# Patient Record
Sex: FEMALE | Race: OTHER | Employment: FULL TIME | URBAN - NONMETROPOLITAN AREA
[De-identification: names, ages, dates, MRNs, and addresses within clinical notes are randomized per-mention and may not be internally consistent; named-entity substitution may affect disease eponyms.]

---

## 2017-03-22 ENCOUNTER — OFFICE VISIT (OUTPATIENT)
Dept: PRIMARY CARE CLINIC | Age: 37
End: 2017-03-22
Payer: COMMERCIAL

## 2017-03-22 VITALS
HEART RATE: 114 BPM | BODY MASS INDEX: 35.3 KG/M2 | HEIGHT: 60 IN | RESPIRATION RATE: 18 BRPM | TEMPERATURE: 100.7 F | SYSTOLIC BLOOD PRESSURE: 124 MMHG | WEIGHT: 179.8 LBS | DIASTOLIC BLOOD PRESSURE: 72 MMHG | OXYGEN SATURATION: 97 %

## 2017-03-22 DIAGNOSIS — J11.1 INFLUENZA-LIKE ILLNESS: Primary | ICD-10-CM

## 2017-03-22 DIAGNOSIS — J02.9 SORE THROAT: ICD-10-CM

## 2017-03-22 DIAGNOSIS — R05.9 COUGH: ICD-10-CM

## 2017-03-22 LAB
INFLUENZA A ANTIBODY: NORMAL
INFLUENZA B ANTIBODY: NORMAL
S PYO AG THROAT QL: NORMAL

## 2017-03-22 PROCEDURE — 87804 INFLUENZA ASSAY W/OPTIC: CPT | Performed by: NURSE PRACTITIONER

## 2017-03-22 PROCEDURE — 99213 OFFICE O/P EST LOW 20 MIN: CPT | Performed by: NURSE PRACTITIONER

## 2017-03-22 PROCEDURE — 87880 STREP A ASSAY W/OPTIC: CPT | Performed by: NURSE PRACTITIONER

## 2017-03-22 ASSESSMENT — ENCOUNTER SYMPTOMS
RHINORRHEA: 1
SWOLLEN GLANDS: 1
SORE THROAT: 1
COUGH: 1

## 2017-03-31 ENCOUNTER — OFFICE VISIT (OUTPATIENT)
Dept: PRIMARY CARE CLINIC | Age: 37
End: 2017-03-31
Payer: COMMERCIAL

## 2017-03-31 VITALS
WEIGHT: 177.4 LBS | SYSTOLIC BLOOD PRESSURE: 110 MMHG | HEIGHT: 60 IN | HEART RATE: 80 BPM | BODY MASS INDEX: 34.83 KG/M2 | DIASTOLIC BLOOD PRESSURE: 68 MMHG

## 2017-03-31 DIAGNOSIS — H10.31 ACUTE BACTERIAL CONJUNCTIVITIS OF RIGHT EYE: Primary | ICD-10-CM

## 2017-03-31 PROCEDURE — 99213 OFFICE O/P EST LOW 20 MIN: CPT | Performed by: NURSE PRACTITIONER

## 2017-03-31 RX ORDER — SULFACETAMIDE SODIUM 100 MG/ML
2 SOLUTION/ DROPS OPHTHALMIC 4 TIMES DAILY
Qty: 1 BOTTLE | Refills: 0 | Status: SHIPPED | OUTPATIENT
Start: 2017-03-31 | End: 2017-04-10

## 2017-03-31 ASSESSMENT — ENCOUNTER SYMPTOMS
FOREIGN BODY SENSATION: 1
COUGH: 0
WHEEZING: 0
SHORTNESS OF BREATH: 0
EYE REDNESS: 1
EYE PAIN: 1
BLURRED VISION: 0
EYE DISCHARGE: 1
EYE ITCHING: 1

## 2017-04-03 ENCOUNTER — HOSPITAL ENCOUNTER (OUTPATIENT)
Age: 37
Setting detail: SPECIMEN
Discharge: HOME | End: 2017-04-03
Payer: COMMERCIAL

## 2017-04-03 ENCOUNTER — TELEPHONE (OUTPATIENT)
Dept: FAMILY MEDICINE CLINIC | Age: 37
End: 2017-04-03

## 2017-04-03 ENCOUNTER — OFFICE VISIT (OUTPATIENT)
Dept: INTERNAL MEDICINE | Age: 37
End: 2017-04-03
Payer: COMMERCIAL

## 2017-04-03 VITALS
HEART RATE: 92 BPM | BODY MASS INDEX: 34.71 KG/M2 | DIASTOLIC BLOOD PRESSURE: 74 MMHG | WEIGHT: 176.8 LBS | HEIGHT: 60 IN | SYSTOLIC BLOOD PRESSURE: 112 MMHG

## 2017-04-03 DIAGNOSIS — Z13.29 SCREENING FOR THYROID DISORDER: ICD-10-CM

## 2017-04-03 DIAGNOSIS — Z12.4 CERVICAL CANCER SCREENING: ICD-10-CM

## 2017-04-03 DIAGNOSIS — R45.86 MOOD SWING: ICD-10-CM

## 2017-04-03 DIAGNOSIS — Z13.220 SCREENING FOR LIPOID DISORDERS: ICD-10-CM

## 2017-04-03 DIAGNOSIS — H04.209 WATERING OF EYE: Primary | ICD-10-CM

## 2017-04-03 DIAGNOSIS — Z01.419 WELL FEMALE EXAM WITH ROUTINE GYNECOLOGICAL EXAM: Primary | ICD-10-CM

## 2017-04-03 PROCEDURE — 99395 PREV VISIT EST AGE 18-39: CPT | Performed by: NURSE PRACTITIONER

## 2017-04-03 RX ORDER — FLUOXETINE 10 MG/1
10 CAPSULE ORAL DAILY
Qty: 30 CAPSULE | Refills: 0 | Status: SHIPPED | OUTPATIENT
Start: 2017-04-03 | End: 2017-04-03

## 2017-04-03 RX ORDER — IBUPROFEN 800 MG/1
800 TABLET ORAL EVERY 6 HOURS PRN
Qty: 80 TABLET | Refills: 3 | Status: SHIPPED | OUTPATIENT
Start: 2017-04-03 | End: 2017-05-26

## 2017-04-05 ENCOUNTER — OFFICE VISIT (OUTPATIENT)
Dept: OPTOMETRY | Age: 37
End: 2017-04-05
Payer: COMMERCIAL

## 2017-04-05 DIAGNOSIS — B00.52 HERPES SIMPLEX KERATITIS OF RIGHT EYE: Primary | ICD-10-CM

## 2017-04-05 PROCEDURE — 99203 OFFICE O/P NEW LOW 30 MIN: CPT | Performed by: OPTOMETRIST

## 2017-04-05 RX ORDER — TRIFLURIDINE 10 MG/ML
1 SOLUTION OPHTHALMIC
Qty: 1 BOTTLE | Refills: 3 | Status: SHIPPED | OUTPATIENT
Start: 2017-04-05 | End: 2017-05-26 | Stop reason: ALTCHOICE

## 2017-04-05 ASSESSMENT — VISUAL ACUITY
OS_SC: 20/20
METHOD: SNELLEN - LINEAR
OD_SC: 20/30

## 2017-04-06 ENCOUNTER — OFFICE VISIT (OUTPATIENT)
Dept: OPTOMETRY | Age: 37
End: 2017-04-06

## 2017-04-06 DIAGNOSIS — B00.52 HERPES SIMPLEX KERATITIS OF RIGHT EYE: Primary | ICD-10-CM

## 2017-04-06 LAB — CYTOLOGY REPORT: NORMAL

## 2017-04-06 PROCEDURE — 99999 PR OFFICE/OUTPT VISIT,PROCEDURE ONLY: CPT | Performed by: OPTOMETRIST

## 2017-04-06 ASSESSMENT — SLIT LAMP EXAM - LIDS: COMMENTS: SLIGHT TEARING

## 2017-04-06 ASSESSMENT — VISUAL ACUITY
METHOD: SNELLEN - LINEAR
OD_SC: 20/25
OS_SC: 20/20

## 2017-04-06 ASSESSMENT — ENCOUNTER SYMPTOMS: EYES NEGATIVE: 1

## 2017-04-13 ENCOUNTER — OFFICE VISIT (OUTPATIENT)
Dept: OPTOMETRY | Age: 37
End: 2017-04-13
Payer: COMMERCIAL

## 2017-04-13 DIAGNOSIS — B00.52 HERPES SIMPLEX KERATITIS OF RIGHT EYE: Primary | ICD-10-CM

## 2017-04-13 PROCEDURE — 99212 OFFICE O/P EST SF 10 MIN: CPT | Performed by: OPTOMETRIST

## 2017-04-13 ASSESSMENT — VISUAL ACUITY
OS_SC: 20/20
METHOD: SNELLEN - LINEAR
OD_SC: 20/30

## 2017-04-18 ENCOUNTER — OFFICE VISIT (OUTPATIENT)
Dept: OPTOMETRY | Age: 37
End: 2017-04-18
Payer: COMMERCIAL

## 2017-04-18 DIAGNOSIS — B00.52 HERPES SIMPLEX KERATITIS OF RIGHT EYE: Primary | ICD-10-CM

## 2017-04-18 DIAGNOSIS — H17.9 CORNEAL SCAR, RIGHT EYE: ICD-10-CM

## 2017-04-18 PROCEDURE — 99212 OFFICE O/P EST SF 10 MIN: CPT | Performed by: OPTOMETRIST

## 2017-04-18 RX ORDER — PREDNISOLONE ACETATE 10 MG/ML
1 SUSPENSION/ DROPS OPHTHALMIC 4 TIMES DAILY
Qty: 1 BOTTLE | Refills: 0 | Status: SHIPPED | OUTPATIENT
Start: 2017-04-18 | End: 2017-05-26 | Stop reason: ALTCHOICE

## 2017-04-18 ASSESSMENT — SLIT LAMP EXAM - LIDS: COMMENTS: NORMAL

## 2017-04-18 ASSESSMENT — VISUAL ACUITY
METHOD: SNELLEN - LINEAR
OS_SC: 20/20
OD_SC: 20/20

## 2017-05-01 ENCOUNTER — TELEPHONE (OUTPATIENT)
Dept: OBGYN | Age: 37
End: 2017-05-01

## 2017-05-01 DIAGNOSIS — N91.2 AMENORRHEA: Primary | ICD-10-CM

## 2017-05-02 ENCOUNTER — OFFICE VISIT (OUTPATIENT)
Dept: OPTOMETRY | Age: 37
End: 2017-05-02

## 2017-05-02 DIAGNOSIS — B00.52 HERPES SIMPLEX KERATITIS OF RIGHT EYE: Primary | ICD-10-CM

## 2017-05-02 PROCEDURE — 99999 PR OFFICE/OUTPT VISIT,PROCEDURE ONLY: CPT | Performed by: OPTOMETRIST

## 2017-05-02 ASSESSMENT — VISUAL ACUITY
OS_SC: 20/20
METHOD: SNELLEN - LINEAR
OD_SC: 20/20

## 2017-05-02 ASSESSMENT — TONOMETRY
IOP_METHOD: PALPATION
OD_IOP_MMHG: 16

## 2017-05-02 ASSESSMENT — SLIT LAMP EXAM - LIDS
COMMENTS: NORMAL
COMMENTS: NORMAL

## 2017-05-26 ENCOUNTER — ROUTINE PRENATAL (OUTPATIENT)
Dept: OBGYN | Age: 37
End: 2017-05-26

## 2017-05-26 VITALS — BODY MASS INDEX: 35.35 KG/M2 | WEIGHT: 178 LBS

## 2017-05-26 DIAGNOSIS — Z34.91 UNCERTAIN DATES, ANTEPARTUM, FIRST TRIMESTER: Primary | ICD-10-CM

## 2017-05-26 DIAGNOSIS — Z3A.09 9 WEEKS GESTATION OF PREGNANCY: ICD-10-CM

## 2017-05-26 LAB
ABO/RH: NORMAL
ANTIBODY SCREEN: NEGATIVE

## 2017-05-26 PROCEDURE — 0500F INITIAL PRENATAL CARE VISIT: CPT | Performed by: OBSTETRICS & GYNECOLOGY

## 2017-05-26 RX ORDER — VITAMIN A ACETATE, BETA CAROTENE, ASCORBIC ACID, CHOLECALCIFEROL, .ALPHA.-TOCOPHEROL ACETATE, DL-, THIAMINE MONONITRATE, RIBOFLAVIN, NIACINAMIDE, PYRIDOXINE HYDROCHLORIDE, FOLIC ACID, CYANOCOBALAMIN, CALCIUM CARBONATE, FERROUS FUMARATE, ZINC OXIDE, CUPRIC OXIDE 3080; 12; 120; 400; 1; 1.84; 3; 20; 22; 920; 25; 200; 27; 10; 2 [IU]/1; UG/1; MG/1; [IU]/1; MG/1; MG/1; MG/1; MG/1; MG/1; [IU]/1; MG/1; MG/1; MG/1; MG/1; MG/1
1 TABLET, FILM COATED ORAL DAILY
Qty: 90 TABLET | Refills: 3 | Status: SHIPPED | OUTPATIENT
Start: 2017-05-26

## 2017-05-30 DIAGNOSIS — Q51.3 BICORNATE UTERUS: Primary | ICD-10-CM

## 2017-06-20 ENCOUNTER — INITIAL PRENATAL (OUTPATIENT)
Dept: OBGYN | Age: 37
End: 2017-06-20
Payer: COMMERCIAL

## 2017-06-20 ENCOUNTER — HOSPITAL ENCOUNTER (OUTPATIENT)
Age: 37
Setting detail: SPECIMEN
Discharge: HOME | End: 2017-06-20
Payer: COMMERCIAL

## 2017-06-20 VITALS
HEART RATE: 80 BPM | SYSTOLIC BLOOD PRESSURE: 104 MMHG | WEIGHT: 177 LBS | BODY MASS INDEX: 35.15 KG/M2 | DIASTOLIC BLOOD PRESSURE: 76 MMHG

## 2017-06-20 DIAGNOSIS — O09.521 AMA (ADVANCED MATERNAL AGE) MULTIGRAVIDA 35+, FIRST TRIMESTER: Primary | ICD-10-CM

## 2017-06-20 LAB
DIRECT EXAM: ABNORMAL
Lab: ABNORMAL
SPECIMEN DESCRIPTION: ABNORMAL
SPECIMEN DESCRIPTION: ABNORMAL
STATUS: ABNORMAL

## 2017-06-20 PROCEDURE — 87491 CHLMYD TRACH DNA AMP PROBE: CPT | Performed by: FAMILY MEDICINE

## 2017-06-20 PROCEDURE — 0500F INITIAL PRENATAL CARE VISIT: CPT | Performed by: OBSTETRICS & GYNECOLOGY

## 2017-06-20 PROCEDURE — 87660 TRICHOMONAS VAGIN DIR PROBE: CPT | Performed by: FAMILY MEDICINE

## 2017-06-20 PROCEDURE — 87480 CANDIDA DNA DIR PROBE: CPT | Performed by: FAMILY MEDICINE

## 2017-06-20 PROCEDURE — 87591 N.GONORRHOEAE DNA AMP PROB: CPT | Performed by: FAMILY MEDICINE

## 2017-06-20 PROCEDURE — 87510 GARDNER VAG DNA DIR PROBE: CPT | Performed by: FAMILY MEDICINE

## 2017-06-21 LAB
C TRACH DNA GENITAL QL NAA+PROBE: NEGATIVE
CYTOLOGY REPORT: NORMAL
N. GONORRHOEAE DNA: NEGATIVE

## 2017-06-30 ENCOUNTER — ROUTINE PRENATAL (OUTPATIENT)
Dept: PERINATAL CARE | Age: 37
End: 2017-06-30
Payer: COMMERCIAL

## 2017-06-30 VITALS
DIASTOLIC BLOOD PRESSURE: 69 MMHG | RESPIRATION RATE: 16 BRPM | TEMPERATURE: 97.8 F | BODY MASS INDEX: 35.25 KG/M2 | WEIGHT: 177.5 LBS | SYSTOLIC BLOOD PRESSURE: 116 MMHG | HEART RATE: 90 BPM

## 2017-06-30 DIAGNOSIS — O09.521 AMA (ADVANCED MATERNAL AGE) MULTIGRAVIDA 35+, FIRST TRIMESTER: Primary | ICD-10-CM

## 2017-06-30 DIAGNOSIS — O99.211 OBESITY COMPLICATING PREGNANCY, FIRST TRIMESTER: ICD-10-CM

## 2017-06-30 DIAGNOSIS — O34.01: ICD-10-CM

## 2017-06-30 DIAGNOSIS — Z3A.13 13 WEEKS GESTATION OF PREGNANCY: ICD-10-CM

## 2017-06-30 DIAGNOSIS — Z36.9 FIRST TRIMESTER SCREENING: ICD-10-CM

## 2017-06-30 PROBLEM — O34.00 CONGENITAL ABNORMALITIES OF PREGNANT UTERUS, ANTEPARTUM: Status: ACTIVE | Noted: 2017-06-30

## 2017-06-30 PROBLEM — O99.210 OBESITY COMPLICATING PREGNANCY: Status: ACTIVE | Noted: 2017-06-30

## 2017-06-30 PROBLEM — O09.529 AMA (ADVANCED MATERNAL AGE) MULTIGRAVIDA 35+: Status: ACTIVE | Noted: 2017-06-30

## 2017-06-30 PROCEDURE — 76813 OB US NUCHAL MEAS 1 GEST: CPT | Performed by: OBSTETRICS & GYNECOLOGY

## 2017-06-30 PROCEDURE — 76801 OB US < 14 WKS SINGLE FETUS: CPT | Performed by: OBSTETRICS & GYNECOLOGY

## 2017-06-30 PROCEDURE — 99242 OFF/OP CONSLTJ NEW/EST SF 20: CPT | Performed by: OBSTETRICS & GYNECOLOGY

## 2017-07-06 ENCOUNTER — HOSPITAL ENCOUNTER (OUTPATIENT)
Age: 37
Discharge: HOME | End: 2017-07-06
Payer: COMMERCIAL

## 2017-07-06 PROCEDURE — 36415 COLL VENOUS BLD VENIPUNCTURE: CPT

## 2017-07-07 LAB
SEND OUT REPORT: NORMAL
TEST NAME: NORMAL

## 2017-07-10 LAB
GLUCOSE ADMINISTRATION: ABNORMAL
GLUCOSE TOLERANCE SCREEN 50G: 197 MG/DL (ref 70–135)

## 2017-07-18 ENCOUNTER — ROUTINE PRENATAL (OUTPATIENT)
Dept: OBGYN | Age: 37
End: 2017-07-18

## 2017-07-18 VITALS
HEART RATE: 84 BPM | DIASTOLIC BLOOD PRESSURE: 74 MMHG | SYSTOLIC BLOOD PRESSURE: 122 MMHG | WEIGHT: 180 LBS | BODY MASS INDEX: 35.75 KG/M2

## 2017-07-18 DIAGNOSIS — Z34.92 PRENATAL CARE IN SECOND TRIMESTER: Primary | ICD-10-CM

## 2017-07-18 PROCEDURE — 0502F SUBSEQUENT PRENATAL CARE: CPT | Performed by: OBSTETRICS & GYNECOLOGY

## 2017-07-20 ENCOUNTER — TELEPHONE (OUTPATIENT)
Dept: PERINATAL CARE | Age: 37
End: 2017-07-20

## 2017-07-20 ENCOUNTER — ROUTINE PRENATAL (OUTPATIENT)
Dept: PERINATAL CARE | Age: 37
End: 2017-07-20
Payer: COMMERCIAL

## 2017-07-20 VITALS
WEIGHT: 181 LBS | DIASTOLIC BLOOD PRESSURE: 62 MMHG | TEMPERATURE: 98 F | SYSTOLIC BLOOD PRESSURE: 108 MMHG | HEART RATE: 92 BPM | BODY MASS INDEX: 35.95 KG/M2 | RESPIRATION RATE: 16 BRPM

## 2017-07-20 DIAGNOSIS — O24.119 PRE-EXISTING TYPE 2 DIABETES MELLITUS DURING PREGNANCY, ANTEPARTUM: ICD-10-CM

## 2017-07-20 DIAGNOSIS — O99.212 OBESITY COMPLICATING PREGNANCY, SECOND TRIMESTER: ICD-10-CM

## 2017-07-20 DIAGNOSIS — O09.522 AMA (ADVANCED MATERNAL AGE) MULTIGRAVIDA 35+, SECOND TRIMESTER: ICD-10-CM

## 2017-07-20 DIAGNOSIS — Z13.89 ENCOUNTER FOR ROUTINE SCREENING FOR MALFORMATION USING ULTRASONICS: ICD-10-CM

## 2017-07-20 DIAGNOSIS — Z3A.16 16 WEEKS GESTATION OF PREGNANCY: ICD-10-CM

## 2017-07-20 DIAGNOSIS — O24.419 GESTATIONAL DIABETES MELLITUS (GDM) IN SECOND TRIMESTER, GESTATIONAL DIABETES METHOD OF CONTROL UNSPECIFIED: Primary | ICD-10-CM

## 2017-07-20 DIAGNOSIS — O99.810 ABNORMAL MATERNAL GLUCOSE TOLERANCE, ANTEPARTUM: Primary | ICD-10-CM

## 2017-07-20 DIAGNOSIS — O34.02: Primary | ICD-10-CM

## 2017-07-20 PROCEDURE — 76805 OB US >/= 14 WKS SNGL FETUS: CPT | Performed by: OBSTETRICS & GYNECOLOGY

## 2017-07-20 PROCEDURE — 76817 TRANSVAGINAL US OBSTETRIC: CPT | Performed by: OBSTETRICS & GYNECOLOGY

## 2017-07-24 LAB
BUN BLDV-MCNC: 8 MG/DL (ref 6–20)
CREAT SERPL-MCNC: 0.41 MG/DL (ref 0.5–0.9)
CREAT SERPL-MCNC: 0.41 MG/DL (ref 0.5–0.9)
CREATININE CLEARANCE: 61.8 ML/MIN/BSA (ref 71–151)
CREATININE TIMED UR: NORMAL MG/ X H
CREATININE URINE: 57.8 MG/DL
CREATININE URINE: 57.8 MG/DL (ref 28–217)
CREATININE, 24H UR: 1017 MG/24 H (ref 740–1570)
ESTIMATED AVERAGE GLUCOSE: 114 MG/DL
GFR AFRICAN AMERICAN: >60 ML/MIN
GFR NON-AFRICAN AMERICAN: >60 ML/MIN
GFR SERPL CREATININE-BSD FRML MDRD: ABNORMAL ML/MIN/{1.73_M2}
GFR SERPL CREATININE-BSD FRML MDRD: ABNORMAL ML/MIN/{1.73_M2}
HBA1C MFR BLD: 5.6 % (ref 4.8–5.9)
HOURS COLLECTED: 24 H
HOURS COLLECTED: 24 H
LENGTH OF COLLECTION: 24 H
PATIENT HEIGHT: 152 CM
PATIENT WEIGHT: 852 KG
PROTEIN 24 HOUR URINE: 141 MG/24 H
PROTEIN,TOT TIMED UR: NORMAL MG/X H
URINE TOTAL PROTEIN: 8 MG/DL
VOLUME: 1760 ML

## 2017-08-04 ENCOUNTER — ROUTINE PRENATAL (OUTPATIENT)
Dept: PERINATAL CARE | Age: 37
End: 2017-08-04
Payer: COMMERCIAL

## 2017-08-04 ENCOUNTER — TELEPHONE (OUTPATIENT)
Dept: PERINATAL CARE | Age: 37
End: 2017-08-04

## 2017-08-04 VITALS
HEIGHT: 60 IN | TEMPERATURE: 98.1 F | SYSTOLIC BLOOD PRESSURE: 110 MMHG | RESPIRATION RATE: 16 BRPM | HEART RATE: 87 BPM | WEIGHT: 180 LBS | DIASTOLIC BLOOD PRESSURE: 63 MMHG | BODY MASS INDEX: 35.34 KG/M2

## 2017-08-04 DIAGNOSIS — Z3A.18 18 WEEKS GESTATION OF PREGNANCY: ICD-10-CM

## 2017-08-04 DIAGNOSIS — O99.212 OBESITY COMPLICATING PREGNANCY, SECOND TRIMESTER: ICD-10-CM

## 2017-08-04 DIAGNOSIS — O24.119 PRE-EXISTING TYPE 2 DIABETES MELLITUS DURING PREGNANCY, ANTEPARTUM: Primary | ICD-10-CM

## 2017-08-04 DIAGNOSIS — O09.522 AMA (ADVANCED MATERNAL AGE) MULTIGRAVIDA 35+, SECOND TRIMESTER: ICD-10-CM

## 2017-08-04 DIAGNOSIS — O34.02: ICD-10-CM

## 2017-08-04 PROCEDURE — 76815 OB US LIMITED FETUS(S): CPT | Performed by: OBSTETRICS & GYNECOLOGY

## 2017-08-04 PROCEDURE — 76817 TRANSVAGINAL US OBSTETRIC: CPT | Performed by: OBSTETRICS & GYNECOLOGY

## 2017-08-04 PROCEDURE — 99242 OFF/OP CONSLTJ NEW/EST SF 20: CPT | Performed by: OBSTETRICS & GYNECOLOGY

## 2017-08-15 ENCOUNTER — ROUTINE PRENATAL (OUTPATIENT)
Dept: OBGYN | Age: 37
End: 2017-08-15
Payer: COMMERCIAL

## 2017-08-15 VITALS
HEART RATE: 84 BPM | BODY MASS INDEX: 35.54 KG/M2 | SYSTOLIC BLOOD PRESSURE: 102 MMHG | DIASTOLIC BLOOD PRESSURE: 62 MMHG | WEIGHT: 182 LBS

## 2017-08-15 DIAGNOSIS — O20.0 THREATENED ABORTION IN SECOND TRIMESTER: ICD-10-CM

## 2017-08-15 DIAGNOSIS — Z3A.20 20 WEEKS GESTATION OF PREGNANCY: Primary | ICD-10-CM

## 2017-08-15 DIAGNOSIS — O24.419 GESTATIONAL DIABETES MELLITUS (GDM) IN SECOND TRIMESTER, GESTATIONAL DIABETES METHOD OF CONTROL UNSPECIFIED: ICD-10-CM

## 2017-08-15 PROCEDURE — 99213 OFFICE O/P EST LOW 20 MIN: CPT | Performed by: OBSTETRICS & GYNECOLOGY

## 2017-08-17 ENCOUNTER — HOSPITAL ENCOUNTER (OUTPATIENT)
Dept: DIABETES SERVICES | Age: 37
Setting detail: THERAPIES SERIES
Discharge: HOME | End: 2017-08-17
Payer: COMMERCIAL

## 2017-08-17 ENCOUNTER — ROUTINE PRENATAL (OUTPATIENT)
Dept: PERINATAL CARE | Age: 37
End: 2017-08-17
Payer: COMMERCIAL

## 2017-08-17 VITALS
HEART RATE: 86 BPM | BODY MASS INDEX: 35.93 KG/M2 | HEIGHT: 60 IN | SYSTOLIC BLOOD PRESSURE: 100 MMHG | DIASTOLIC BLOOD PRESSURE: 62 MMHG | WEIGHT: 183 LBS

## 2017-08-17 VITALS
DIASTOLIC BLOOD PRESSURE: 66 MMHG | WEIGHT: 180.56 LBS | HEART RATE: 89 BPM | HEIGHT: 60 IN | BODY MASS INDEX: 35.45 KG/M2 | SYSTOLIC BLOOD PRESSURE: 117 MMHG

## 2017-08-17 DIAGNOSIS — O24.119 PRE-EXISTING TYPE 2 DIABETES MELLITUS DURING PREGNANCY, ANTEPARTUM: Primary | ICD-10-CM

## 2017-08-17 DIAGNOSIS — Z3A.20 20 WEEKS GESTATION OF PREGNANCY: ICD-10-CM

## 2017-08-17 DIAGNOSIS — O99.212 OBESITY COMPLICATING PREGNANCY, SECOND TRIMESTER: ICD-10-CM

## 2017-08-17 DIAGNOSIS — Z36.86 ENCOUNTER FOR SCREENING FOR RISK OF PRE-TERM LABOR: ICD-10-CM

## 2017-08-17 DIAGNOSIS — O09.522 AMA (ADVANCED MATERNAL AGE) MULTIGRAVIDA 35+, SECOND TRIMESTER: ICD-10-CM

## 2017-08-17 DIAGNOSIS — O34.02: ICD-10-CM

## 2017-08-17 PROCEDURE — 76811 OB US DETAILED SNGL FETUS: CPT | Performed by: OBSTETRICS & GYNECOLOGY

## 2017-08-17 PROCEDURE — G0108 DIAB MANAGE TRN  PER INDIV: HCPCS

## 2017-08-17 PROCEDURE — 76817 TRANSVAGINAL US OBSTETRIC: CPT | Performed by: OBSTETRICS & GYNECOLOGY

## 2017-08-23 ENCOUNTER — TELEPHONE (OUTPATIENT)
Dept: PERINATAL CARE | Age: 37
End: 2017-08-23

## 2017-08-25 ENCOUNTER — OFFICE VISIT (OUTPATIENT)
Dept: PRIMARY CARE CLINIC | Age: 37
End: 2017-08-25
Payer: COMMERCIAL

## 2017-08-25 ENCOUNTER — HOSPITAL ENCOUNTER (OUTPATIENT)
Age: 37
Setting detail: SPECIMEN
Discharge: HOME | End: 2017-08-25
Payer: COMMERCIAL

## 2017-08-25 VITALS
SYSTOLIC BLOOD PRESSURE: 118 MMHG | DIASTOLIC BLOOD PRESSURE: 78 MMHG | TEMPERATURE: 98.2 F | BODY MASS INDEX: 35.43 KG/M2 | OXYGEN SATURATION: 99 % | WEIGHT: 181.4 LBS | HEART RATE: 94 BPM

## 2017-08-25 DIAGNOSIS — O26.892 VAGINAL DISCHARGE DURING PREGNANCY IN SECOND TRIMESTER: Primary | ICD-10-CM

## 2017-08-25 DIAGNOSIS — O26.892 VAGINAL DISCHARGE DURING PREGNANCY IN SECOND TRIMESTER: ICD-10-CM

## 2017-08-25 DIAGNOSIS — N89.8 VAGINAL DISCHARGE DURING PREGNANCY IN SECOND TRIMESTER: ICD-10-CM

## 2017-08-25 DIAGNOSIS — N89.8 VAGINAL DISCHARGE DURING PREGNANCY IN SECOND TRIMESTER: Primary | ICD-10-CM

## 2017-08-25 DIAGNOSIS — N89.8 VAGINAL ODOR: ICD-10-CM

## 2017-08-25 PROCEDURE — 87480 CANDIDA DNA DIR PROBE: CPT

## 2017-08-25 PROCEDURE — 99213 OFFICE O/P EST LOW 20 MIN: CPT | Performed by: NURSE PRACTITIONER

## 2017-08-25 PROCEDURE — 87660 TRICHOMONAS VAGIN DIR PROBE: CPT

## 2017-08-25 PROCEDURE — 87510 GARDNER VAG DNA DIR PROBE: CPT

## 2017-08-25 ASSESSMENT — ENCOUNTER SYMPTOMS
BACK PAIN: 1
ABDOMINAL PAIN: 1
RESPIRATORY NEGATIVE: 1

## 2017-08-26 LAB
DIRECT EXAM: NORMAL
Lab: NORMAL
SPECIMEN DESCRIPTION: NORMAL
SPECIMEN DESCRIPTION: NORMAL
STATUS: NORMAL

## 2017-08-29 ENCOUNTER — TELEPHONE (OUTPATIENT)
Dept: PERINATAL CARE | Age: 37
End: 2017-08-29

## 2017-08-29 ENCOUNTER — ROUTINE PRENATAL (OUTPATIENT)
Dept: PERINATAL CARE | Age: 37
End: 2017-08-29
Payer: COMMERCIAL

## 2017-08-29 VITALS
RESPIRATION RATE: 20 BRPM | WEIGHT: 184 LBS | DIASTOLIC BLOOD PRESSURE: 63 MMHG | HEART RATE: 92 BPM | BODY MASS INDEX: 35.94 KG/M2 | SYSTOLIC BLOOD PRESSURE: 104 MMHG | TEMPERATURE: 97.9 F

## 2017-08-29 DIAGNOSIS — Z3A.22 22 WEEKS GESTATION OF PREGNANCY: ICD-10-CM

## 2017-08-29 DIAGNOSIS — O24.119 PRE-EXISTING TYPE 2 DIABETES MELLITUS DURING PREGNANCY, ANTEPARTUM: Primary | ICD-10-CM

## 2017-08-29 DIAGNOSIS — O26.872 CERVICAL SHORTENING, ANTEPARTUM CONDITION OR COMPLICATION, SECOND TRIMESTER: ICD-10-CM

## 2017-08-29 DIAGNOSIS — O99.212 OBESITY COMPLICATING PREGNANCY, SECOND TRIMESTER: ICD-10-CM

## 2017-08-29 DIAGNOSIS — O09.522 AMA (ADVANCED MATERNAL AGE) MULTIGRAVIDA 35+, SECOND TRIMESTER: ICD-10-CM

## 2017-08-29 DIAGNOSIS — O34.02: ICD-10-CM

## 2017-08-29 PROBLEM — O26.879 CERVICAL SHORTENING, ANTEPARTUM CONDITION OR COMPLICATION: Status: ACTIVE | Noted: 2017-08-29

## 2017-08-29 PROCEDURE — 76825 ECHO EXAM OF FETAL HEART: CPT | Performed by: OBSTETRICS & GYNECOLOGY

## 2017-08-29 PROCEDURE — 99213 OFFICE O/P EST LOW 20 MIN: CPT | Performed by: OBSTETRICS & GYNECOLOGY

## 2017-08-29 PROCEDURE — 76827 ECHO EXAM OF FETAL HEART: CPT | Performed by: OBSTETRICS & GYNECOLOGY

## 2017-08-29 PROCEDURE — 76817 TRANSVAGINAL US OBSTETRIC: CPT | Performed by: OBSTETRICS & GYNECOLOGY

## 2017-08-29 PROCEDURE — 93325 DOPPLER ECHO COLOR FLOW MAPG: CPT | Performed by: OBSTETRICS & GYNECOLOGY

## 2017-09-08 ENCOUNTER — ROUTINE PRENATAL (OUTPATIENT)
Dept: PERINATAL CARE | Age: 37
End: 2017-09-08
Payer: COMMERCIAL

## 2017-09-08 VITALS
SYSTOLIC BLOOD PRESSURE: 102 MMHG | BODY MASS INDEX: 36.02 KG/M2 | RESPIRATION RATE: 16 BRPM | WEIGHT: 183.5 LBS | TEMPERATURE: 98.3 F | DIASTOLIC BLOOD PRESSURE: 58 MMHG | HEART RATE: 76 BPM | HEIGHT: 60 IN

## 2017-09-08 DIAGNOSIS — Z36.4 ANTENATAL SCREENING FOR FETAL GROWTH RETARDATION USING ULTRASONICS: ICD-10-CM

## 2017-09-08 DIAGNOSIS — O09.522 AMA (ADVANCED MATERNAL AGE) MULTIGRAVIDA 35+, SECOND TRIMESTER: ICD-10-CM

## 2017-09-08 DIAGNOSIS — O34.02: ICD-10-CM

## 2017-09-08 DIAGNOSIS — Z3A.23 23 WEEKS GESTATION OF PREGNANCY: ICD-10-CM

## 2017-09-08 DIAGNOSIS — O24.119 PRE-EXISTING TYPE 2 DIABETES MELLITUS DURING PREGNANCY, ANTEPARTUM: Primary | ICD-10-CM

## 2017-09-08 DIAGNOSIS — O99.212 OBESITY COMPLICATING PREGNANCY, SECOND TRIMESTER: ICD-10-CM

## 2017-09-08 DIAGNOSIS — Z03.75 SUSPECTED CERVICAL SHORTENING NOT FOUND: ICD-10-CM

## 2017-09-08 DIAGNOSIS — O26.872 CERVICAL SHORTENING, ANTEPARTUM CONDITION OR COMPLICATION, SECOND TRIMESTER: ICD-10-CM

## 2017-09-08 PROCEDURE — 76816 OB US FOLLOW-UP PER FETUS: CPT | Performed by: OBSTETRICS & GYNECOLOGY

## 2017-09-08 PROCEDURE — 76817 TRANSVAGINAL US OBSTETRIC: CPT | Performed by: OBSTETRICS & GYNECOLOGY

## 2017-09-11 ENCOUNTER — HOSPITAL ENCOUNTER (OUTPATIENT)
Dept: DIABETES SERVICES | Age: 37
Setting detail: THERAPIES SERIES
Discharge: HOME | End: 2017-09-11
Payer: COMMERCIAL

## 2017-09-11 DIAGNOSIS — O24.119 PRE-EXISTING TYPE 2 DIABETES MELLITUS DURING PREGNANCY, ANTEPARTUM: Primary | ICD-10-CM

## 2017-09-11 PROCEDURE — G0108 DIAB MANAGE TRN  PER INDIV: HCPCS

## 2017-09-14 ENCOUNTER — ROUTINE PRENATAL (OUTPATIENT)
Dept: OBGYN | Age: 37
End: 2017-09-14

## 2017-09-14 VITALS
BODY MASS INDEX: 35.74 KG/M2 | SYSTOLIC BLOOD PRESSURE: 110 MMHG | WEIGHT: 183 LBS | DIASTOLIC BLOOD PRESSURE: 60 MMHG | HEART RATE: 80 BPM

## 2017-09-14 DIAGNOSIS — Z34.92 PRENATAL CARE IN SECOND TRIMESTER: Primary | ICD-10-CM

## 2017-09-14 PROCEDURE — 0502F SUBSEQUENT PRENATAL CARE: CPT | Performed by: OBSTETRICS & GYNECOLOGY

## 2017-09-25 ENCOUNTER — ROUTINE PRENATAL (OUTPATIENT)
Dept: PERINATAL CARE | Age: 37
End: 2017-09-25
Payer: COMMERCIAL

## 2017-09-25 VITALS
HEART RATE: 84 BPM | DIASTOLIC BLOOD PRESSURE: 60 MMHG | BODY MASS INDEX: 35.94 KG/M2 | SYSTOLIC BLOOD PRESSURE: 98 MMHG | TEMPERATURE: 98 F | WEIGHT: 184 LBS | RESPIRATION RATE: 16 BRPM

## 2017-09-25 DIAGNOSIS — O09.522 AMA (ADVANCED MATERNAL AGE) MULTIGRAVIDA 35+, SECOND TRIMESTER: ICD-10-CM

## 2017-09-25 DIAGNOSIS — O99.212 OBESITY COMPLICATING PREGNANCY, SECOND TRIMESTER: ICD-10-CM

## 2017-09-25 DIAGNOSIS — O34.02: ICD-10-CM

## 2017-09-25 DIAGNOSIS — Z3A.26 26 WEEKS GESTATION OF PREGNANCY: ICD-10-CM

## 2017-09-25 DIAGNOSIS — Z03.75 SUSPECTED CERVICAL SHORTENING NOT FOUND: ICD-10-CM

## 2017-09-25 DIAGNOSIS — O26.872 CERVICAL SHORTENING, ANTEPARTUM CONDITION OR COMPLICATION, SECOND TRIMESTER: Primary | ICD-10-CM

## 2017-09-25 DIAGNOSIS — O24.119 PRE-EXISTING TYPE 2 DIABETES MELLITUS DURING PREGNANCY, ANTEPARTUM: ICD-10-CM

## 2017-09-25 PROCEDURE — 76817 TRANSVAGINAL US OBSTETRIC: CPT | Performed by: OBSTETRICS & GYNECOLOGY

## 2017-09-27 ENCOUNTER — TELEPHONE (OUTPATIENT)
Dept: PERINATAL CARE | Age: 37
End: 2017-09-27

## 2017-10-03 ENCOUNTER — TELEPHONE (OUTPATIENT)
Dept: PERINATAL CARE | Age: 37
End: 2017-10-03

## 2017-10-12 ENCOUNTER — ROUTINE PRENATAL (OUTPATIENT)
Dept: OBGYN | Age: 37
End: 2017-10-12

## 2017-10-12 VITALS
WEIGHT: 185 LBS | BODY MASS INDEX: 36.13 KG/M2 | HEART RATE: 80 BPM | DIASTOLIC BLOOD PRESSURE: 68 MMHG | SYSTOLIC BLOOD PRESSURE: 102 MMHG

## 2017-10-12 DIAGNOSIS — Z3A.28 28 WEEKS GESTATION OF PREGNANCY: Primary | ICD-10-CM

## 2017-10-12 DIAGNOSIS — Z34.93 PRENATAL CARE IN THIRD TRIMESTER: ICD-10-CM

## 2017-10-12 PROCEDURE — 0502F SUBSEQUENT PRENATAL CARE: CPT | Performed by: OBSTETRICS & GYNECOLOGY

## 2017-10-16 ENCOUNTER — ROUTINE PRENATAL (OUTPATIENT)
Dept: PERINATAL CARE | Age: 37
End: 2017-10-16
Payer: COMMERCIAL

## 2017-10-16 VITALS
WEIGHT: 186 LBS | HEART RATE: 92 BPM | TEMPERATURE: 98.6 F | BODY MASS INDEX: 36.52 KG/M2 | RESPIRATION RATE: 16 BRPM | HEIGHT: 60 IN | SYSTOLIC BLOOD PRESSURE: 100 MMHG | DIASTOLIC BLOOD PRESSURE: 60 MMHG

## 2017-10-16 DIAGNOSIS — O99.213 OBESITY AFFECTING PREGNANCY IN THIRD TRIMESTER: ICD-10-CM

## 2017-10-16 DIAGNOSIS — Z3A.29 29 WEEKS GESTATION OF PREGNANCY: ICD-10-CM

## 2017-10-16 DIAGNOSIS — Z03.75 SUSPECTED CERVICAL SHORTENING NOT FOUND: ICD-10-CM

## 2017-10-16 DIAGNOSIS — O26.879 CERVICAL SHORTENING, ANTEPARTUM CONDITION OR COMPLICATION: ICD-10-CM

## 2017-10-16 DIAGNOSIS — O34.00 CONGENITAL ABNORMALITIES OF PREGNANT UTERUS, ANTEPARTUM: ICD-10-CM

## 2017-10-16 DIAGNOSIS — Z36.4 ANTENATAL SCREENING FOR FETAL GROWTH RETARDATION USING ULTRASONICS: ICD-10-CM

## 2017-10-16 DIAGNOSIS — Z13.89 ENCOUNTER FOR ROUTINE SCREENING FOR MALFORMATION USING ULTRASONICS: ICD-10-CM

## 2017-10-16 DIAGNOSIS — O24.119 PRE-EXISTING TYPE 2 DIABETES MELLITUS DURING PREGNANCY, ANTEPARTUM: Primary | ICD-10-CM

## 2017-10-16 DIAGNOSIS — O09.523 ELDERLY MULTIGRAVIDA IN THIRD TRIMESTER: ICD-10-CM

## 2017-10-16 PROCEDURE — 76805 OB US >/= 14 WKS SNGL FETUS: CPT | Performed by: OBSTETRICS & GYNECOLOGY

## 2017-10-16 PROCEDURE — 76817 TRANSVAGINAL US OBSTETRIC: CPT | Performed by: OBSTETRICS & GYNECOLOGY

## 2017-10-16 PROCEDURE — 76819 FETAL BIOPHYS PROFIL W/O NST: CPT | Performed by: OBSTETRICS & GYNECOLOGY

## 2017-10-26 ENCOUNTER — TELEPHONE (OUTPATIENT)
Dept: PERINATAL CARE | Age: 37
End: 2017-10-26

## 2017-10-26 PROBLEM — O20.0 THREATENED ABORTION IN SECOND TRIMESTER: Status: ACTIVE | Noted: 2017-08-26

## 2017-10-27 ENCOUNTER — ROUTINE PRENATAL (OUTPATIENT)
Dept: OBGYN | Age: 37
End: 2017-10-27

## 2017-10-27 VITALS
BODY MASS INDEX: 36.13 KG/M2 | WEIGHT: 185 LBS | SYSTOLIC BLOOD PRESSURE: 102 MMHG | DIASTOLIC BLOOD PRESSURE: 68 MMHG | HEART RATE: 80 BPM

## 2017-10-27 DIAGNOSIS — Z34.93 PRENATAL CARE IN THIRD TRIMESTER: Primary | ICD-10-CM

## 2017-10-27 PROCEDURE — 0502F SUBSEQUENT PRENATAL CARE: CPT | Performed by: OBSTETRICS & GYNECOLOGY

## 2017-11-01 ENCOUNTER — TELEPHONE (OUTPATIENT)
Dept: PERINATAL CARE | Age: 37
End: 2017-11-01

## 2017-11-01 NOTE — TELEPHONE ENCOUNTER
Dr. Zoila Dukes had reviewed Linda blood sugar log 10- / 10- and his new insulin recommendations are 4 units before breakfast, 10 units before dinner and NPH 20 units at bedtime. Linda clearly understood Dr. Zoila Dukes new insulin Wali Booth.

## 2017-11-09 ENCOUNTER — HOSPITAL ENCOUNTER (OUTPATIENT)
Age: 37
Setting detail: SPECIMEN
Discharge: HOME | End: 2017-11-09
Payer: COMMERCIAL

## 2017-11-09 ENCOUNTER — ROUTINE PRENATAL (OUTPATIENT)
Dept: OBGYN | Age: 37
End: 2017-11-09

## 2017-11-09 VITALS
BODY MASS INDEX: 36.33 KG/M2 | WEIGHT: 186 LBS | DIASTOLIC BLOOD PRESSURE: 66 MMHG | HEART RATE: 80 BPM | SYSTOLIC BLOOD PRESSURE: 110 MMHG

## 2017-11-09 DIAGNOSIS — N20.0 KIDNEY STONE: Primary | ICD-10-CM

## 2017-11-09 DIAGNOSIS — N20.0 KIDNEY STONE: ICD-10-CM

## 2017-11-09 DIAGNOSIS — Z34.93 PRENATAL CARE IN THIRD TRIMESTER: ICD-10-CM

## 2017-11-09 DIAGNOSIS — O24.414 INSULIN CONTROLLED GESTATIONAL DIABETES MELLITUS (GDM) DURING PREGNANCY, ANTEPARTUM: ICD-10-CM

## 2017-11-09 PROCEDURE — 0502F SUBSEQUENT PRENATAL CARE: CPT | Performed by: OBSTETRICS & GYNECOLOGY

## 2017-11-09 PROCEDURE — 82365 CALCULUS SPECTROSCOPY: CPT

## 2017-11-10 ENCOUNTER — TELEPHONE (OUTPATIENT)
Dept: PERINATAL CARE | Age: 37
End: 2017-11-10

## 2017-11-10 NOTE — TELEPHONE ENCOUNTER
Rx called into the pharmacy for NPH insulin (needles/syringes/vial/pen) 20 units at bedtime a ninety day supply with no refills, Novolog (needles/syringes/vial/pen) insulin 4 units before breakfast, 10 units before dinner a ninety day supply with no refills. Rx called in by [facility] FUNG per Dr. Josias Montoya

## 2017-11-13 ENCOUNTER — ROUTINE PRENATAL (OUTPATIENT)
Dept: OBGYN | Age: 37
End: 2017-11-13
Payer: COMMERCIAL

## 2017-11-13 ENCOUNTER — OFFICE VISIT (OUTPATIENT)
Dept: PRIMARY CARE CLINIC | Age: 37
End: 2017-11-13
Payer: COMMERCIAL

## 2017-11-13 ENCOUNTER — TELEPHONE (OUTPATIENT)
Dept: PERINATAL CARE | Age: 37
End: 2017-11-13

## 2017-11-13 VITALS
HEART RATE: 74 BPM | HEIGHT: 60 IN | OXYGEN SATURATION: 98 % | BODY MASS INDEX: 36.44 KG/M2 | SYSTOLIC BLOOD PRESSURE: 126 MMHG | DIASTOLIC BLOOD PRESSURE: 74 MMHG | WEIGHT: 185.6 LBS | TEMPERATURE: 98.3 F

## 2017-11-13 VITALS
DIASTOLIC BLOOD PRESSURE: 60 MMHG | BODY MASS INDEX: 35.94 KG/M2 | SYSTOLIC BLOOD PRESSURE: 110 MMHG | WEIGHT: 184 LBS | HEART RATE: 80 BPM

## 2017-11-13 DIAGNOSIS — B02.9 HERPES ZOSTER WITHOUT COMPLICATION: Primary | ICD-10-CM

## 2017-11-13 DIAGNOSIS — O24.119 PRE-EXISTING TYPE 2 DIABETES MELLITUS DURING PREGNANCY, ANTEPARTUM: Primary | ICD-10-CM

## 2017-11-13 DIAGNOSIS — O09.523 ELDERLY MULTIGRAVIDA IN THIRD TRIMESTER: ICD-10-CM

## 2017-11-13 PROCEDURE — 59025 FETAL NON-STRESS TEST: CPT | Performed by: OBSTETRICS & GYNECOLOGY

## 2017-11-13 PROCEDURE — 99213 OFFICE O/P EST LOW 20 MIN: CPT | Performed by: PHYSICIAN ASSISTANT

## 2017-11-13 RX ORDER — VALACYCLOVIR HYDROCHLORIDE 1 G/1
1000 TABLET, FILM COATED ORAL 3 TIMES DAILY
Qty: 21 TABLET | Refills: 0 | Status: SHIPPED | OUTPATIENT
Start: 2017-11-13 | End: 2017-11-20

## 2017-11-13 ASSESSMENT — ENCOUNTER SYMPTOMS
SHORTNESS OF BREATH: 0
COUGH: 0
GASTROINTESTINAL NEGATIVE: 1

## 2017-11-13 NOTE — PROGRESS NOTES
Subjective:      Patient ID: Lance Soto is a 40 y.o. female. Rash   This is a new problem. The current episode started yesterday. Location: right chest. The rash is characterized by itchiness, redness and burning. Pertinent negatives include no congestion, cough, facial edema, fever or shortness of breath. Treatments tried: Carmex. The treatment provided no relief. Review of Systems   Constitutional: Negative for fever. HENT: Negative for congestion. Respiratory: Negative for cough and shortness of breath. Cardiovascular: Negative. Gastrointestinal: Negative. Skin: Positive for rash. Objective:   Physical Exam   Constitutional: She is oriented to person, place, and time. She appears well-developed. HENT:   Head: Normocephalic. Eyes: Pupils are equal, round, and reactive to light. Cardiovascular: Normal rate and regular rhythm. Pulmonary/Chest: Effort normal and breath sounds normal.   Neurological: She is alert and oriented to person, place, and time. Skin: Skin is warm. Rash (vesicular rash on erythematous base at right T4 dermatome) noted. Assessment:      1. Herpes zoster without complication          Plan:      Case discussed with Dr. Margarita Villasenor. Valtrex 1 g tid x 7 days. Supportive care advised. Education provided. Follow-up with OB as planned/sooner PRN.

## 2017-11-14 ENCOUNTER — ROUTINE PRENATAL (OUTPATIENT)
Dept: PERINATAL CARE | Age: 37
End: 2017-11-14
Payer: COMMERCIAL

## 2017-11-14 VITALS
RESPIRATION RATE: 16 BRPM | DIASTOLIC BLOOD PRESSURE: 68 MMHG | SYSTOLIC BLOOD PRESSURE: 112 MMHG | HEART RATE: 96 BPM | TEMPERATURE: 97.7 F | HEIGHT: 60 IN | BODY MASS INDEX: 36.71 KG/M2 | WEIGHT: 187 LBS

## 2017-11-14 DIAGNOSIS — O34.00 CONGENITAL ABNORMALITIES OF PREGNANT UTERUS, ANTEPARTUM: ICD-10-CM

## 2017-11-14 DIAGNOSIS — O09.523 ELDERLY MULTIGRAVIDA IN THIRD TRIMESTER: ICD-10-CM

## 2017-11-14 DIAGNOSIS — Z36.4 ANTENATAL SCREENING FOR FETAL GROWTH RETARDATION USING ULTRASONICS: ICD-10-CM

## 2017-11-14 DIAGNOSIS — Z3A.33 33 WEEKS GESTATION OF PREGNANCY: ICD-10-CM

## 2017-11-14 DIAGNOSIS — O99.213 OBESITY AFFECTING PREGNANCY IN THIRD TRIMESTER: ICD-10-CM

## 2017-11-14 DIAGNOSIS — O24.119 PRE-EXISTING TYPE 2 DIABETES MELLITUS DURING PREGNANCY, ANTEPARTUM: Primary | ICD-10-CM

## 2017-11-14 LAB
STONE COMPOSITION: NORMAL
STONE DESCRIPTION: NORMAL
STONE MASS: 11 MG
STONE NUMBER: 1
STONE SIZE: NORMAL MM

## 2017-11-14 PROCEDURE — 93325 DOPPLER ECHO COLOR FLOW MAPG: CPT | Performed by: OBSTETRICS & GYNECOLOGY

## 2017-11-14 PROCEDURE — 76816 OB US FOLLOW-UP PER FETUS: CPT | Performed by: OBSTETRICS & GYNECOLOGY

## 2017-11-14 PROCEDURE — 76819 FETAL BIOPHYS PROFIL W/O NST: CPT | Performed by: OBSTETRICS & GYNECOLOGY

## 2017-11-14 PROCEDURE — 76826 ECHO EXAM OF FETAL HEART: CPT | Performed by: OBSTETRICS & GYNECOLOGY

## 2017-11-14 PROCEDURE — 76827 ECHO EXAM OF FETAL HEART: CPT | Performed by: OBSTETRICS & GYNECOLOGY

## 2017-11-14 NOTE — PROGRESS NOTES
MFM Office Visit:  Blood sugars reviewed (adequate glycemic control). Insulin regimen continue: Novolog 4 units before breakfast and 10 units before dinner: NPH qhs 20 units.

## 2017-11-16 ENCOUNTER — ROUTINE PRENATAL (OUTPATIENT)
Dept: OBGYN | Age: 37
End: 2017-11-16
Payer: COMMERCIAL

## 2017-11-16 VITALS
WEIGHT: 187 LBS | DIASTOLIC BLOOD PRESSURE: 64 MMHG | BODY MASS INDEX: 36.52 KG/M2 | HEART RATE: 72 BPM | SYSTOLIC BLOOD PRESSURE: 116 MMHG

## 2017-11-16 DIAGNOSIS — O24.119 PRE-EXISTING TYPE 2 DIABETES MELLITUS DURING PREGNANCY, ANTEPARTUM: Primary | ICD-10-CM

## 2017-11-16 DIAGNOSIS — O24.414 INSULIN CONTROLLED GESTATIONAL DIABETES MELLITUS (GDM) DURING PREGNANCY, ANTEPARTUM: ICD-10-CM

## 2017-11-16 DIAGNOSIS — Z3A.33 33 WEEKS GESTATION OF PREGNANCY: ICD-10-CM

## 2017-11-16 DIAGNOSIS — O09.521 AMA (ADVANCED MATERNAL AGE) MULTIGRAVIDA 35+, FIRST TRIMESTER: Primary | ICD-10-CM

## 2017-11-16 DIAGNOSIS — Z34.93 PRENATAL CARE IN THIRD TRIMESTER: ICD-10-CM

## 2017-11-16 PROCEDURE — 59025 FETAL NON-STRESS TEST: CPT | Performed by: ADVANCED PRACTICE MIDWIFE

## 2017-11-16 PROCEDURE — 0502F SUBSEQUENT PRENATAL CARE: CPT | Performed by: ADVANCED PRACTICE MIDWIFE

## 2017-11-16 NOTE — PROGRESS NOTES
S:  Sheryle Sorenson presents today for NST. She was recently seen on the  by MFM and reports no new complaints. O:  MVSS, Afebrile. EFM: [id number], moderate variability, accels presents, decels absent, contractions absent. Abdomen soft and Nt.  A:  39 yo  at 33 Weeks 4 Days SIUP, Pregestational Diabetes, Category I FHR Tracing  P:  Begin twice weekly NST with weekly BBP/KERA. Continue compliance with checking BS and insulin regimen. NST reviewed by Dr. Norman Stokes. RTO [facility] [employer] this Friday for NST.

## 2017-11-21 ENCOUNTER — ROUTINE PRENATAL (OUTPATIENT)
Dept: OBGYN | Age: 37
End: 2017-11-21
Payer: COMMERCIAL

## 2017-11-21 ENCOUNTER — HOSPITAL ENCOUNTER (OUTPATIENT)
Dept: ULTRASOUND IMAGING | Age: 37
Discharge: HOME | End: 2017-11-21
Payer: COMMERCIAL

## 2017-11-21 VITALS
HEART RATE: 80 BPM | DIASTOLIC BLOOD PRESSURE: 72 MMHG | BODY MASS INDEX: 36.33 KG/M2 | WEIGHT: 186 LBS | SYSTOLIC BLOOD PRESSURE: 116 MMHG

## 2017-11-21 DIAGNOSIS — Z34.93 PRENATAL CARE IN THIRD TRIMESTER: Primary | ICD-10-CM

## 2017-11-21 DIAGNOSIS — O24.119 PRE-EXISTING TYPE 2 DIABETES MELLITUS DURING PREGNANCY, ANTEPARTUM: ICD-10-CM

## 2017-11-21 DIAGNOSIS — O24.414 INSULIN CONTROLLED GESTATIONAL DIABETES MELLITUS (GDM) IN THIRD TRIMESTER: ICD-10-CM

## 2017-11-21 PROCEDURE — 99999 PR OFFICE/OUTPT VISIT,PROCEDURE ONLY: CPT | Performed by: OBSTETRICS & GYNECOLOGY

## 2017-11-21 PROCEDURE — 59025 FETAL NON-STRESS TEST: CPT | Performed by: OBSTETRICS & GYNECOLOGY

## 2017-11-21 PROCEDURE — 76819 FETAL BIOPHYS PROFIL W/O NST: CPT

## 2017-11-22 ENCOUNTER — TELEPHONE (OUTPATIENT)
Dept: GENERAL RADIOLOGY | Age: 37
End: 2017-11-22

## 2017-11-22 ENCOUNTER — TELEPHONE (OUTPATIENT)
Dept: PERINATAL CARE | Age: 37
End: 2017-11-22

## 2017-11-22 NOTE — TELEPHONE ENCOUNTER
Pt was notified by phone that Dr. Marcy Booth had reviewed her blood sugar log 11- - 11- and he would like for Linda to increase her NPH insulin to 24 units. Pt clearly understood Dr. Marcy Booth new insulin rcommendations.

## 2017-11-27 DIAGNOSIS — Z3A.34 34 WEEKS GESTATION OF PREGNANCY: Primary | ICD-10-CM

## 2017-12-04 ENCOUNTER — TELEPHONE (OUTPATIENT)
Dept: PERINATAL CARE | Age: 37
End: 2017-12-04

## 2017-12-04 NOTE — TELEPHONE ENCOUNTER
Blood sugars reviewed by Dr Minal Gann Pt notified of recommendations to increase Novolog at dinner to 14 units at bedtime. Pt voiced understanding.

## 2017-12-12 ENCOUNTER — ROUTINE PRENATAL (OUTPATIENT)
Dept: PERINATAL CARE | Age: 37
End: 2017-12-12
Payer: COMMERCIAL

## 2017-12-12 VITALS
SYSTOLIC BLOOD PRESSURE: 105 MMHG | BODY MASS INDEX: 36.91 KG/M2 | TEMPERATURE: 97.9 F | DIASTOLIC BLOOD PRESSURE: 59 MMHG | RESPIRATION RATE: 18 BRPM | WEIGHT: 188 LBS | HEART RATE: 85 BPM | HEIGHT: 60 IN

## 2017-12-12 DIAGNOSIS — O99.213 OBESITY AFFECTING PREGNANCY IN THIRD TRIMESTER: ICD-10-CM

## 2017-12-12 DIAGNOSIS — Z36.4 ANTENATAL SCREENING FOR FETAL GROWTH RETARDATION USING ULTRASONICS: ICD-10-CM

## 2017-12-12 DIAGNOSIS — O09.523 ELDERLY MULTIGRAVIDA IN THIRD TRIMESTER: ICD-10-CM

## 2017-12-12 DIAGNOSIS — O34.00 CONGENITAL ABNORMALITIES OF PREGNANT UTERUS, ANTEPARTUM: ICD-10-CM

## 2017-12-12 DIAGNOSIS — O24.119 PRE-EXISTING TYPE 2 DIABETES MELLITUS DURING PREGNANCY, ANTEPARTUM: Primary | ICD-10-CM

## 2017-12-12 DIAGNOSIS — Z3A.37 37 WEEKS GESTATION OF PREGNANCY: ICD-10-CM

## 2017-12-12 DIAGNOSIS — Z13.89 ENCOUNTER FOR ROUTINE SCREENING FOR MALFORMATION USING ULTRASONICS: ICD-10-CM

## 2017-12-12 PROCEDURE — 76805 OB US >/= 14 WKS SNGL FETUS: CPT | Performed by: OBSTETRICS & GYNECOLOGY

## 2017-12-12 PROCEDURE — 76819 FETAL BIOPHYS PROFIL W/O NST: CPT | Performed by: OBSTETRICS & GYNECOLOGY

## 2017-12-14 ENCOUNTER — TELEPHONE (OUTPATIENT)
Dept: OBGYN | Age: 37
End: 2017-12-14

## 2018-03-13 ENCOUNTER — OFFICE VISIT (OUTPATIENT)
Dept: OPTOMETRY | Age: 38
End: 2018-03-13
Payer: COMMERCIAL

## 2018-03-13 DIAGNOSIS — B00.52 HERPES SIMPLEX KERATITIS OF RIGHT EYE: ICD-10-CM

## 2018-03-13 PROCEDURE — 99213 OFFICE O/P EST LOW 20 MIN: CPT | Performed by: OPTOMETRIST

## 2018-03-13 RX ORDER — TRIFLURIDINE 10 MG/ML
1 SOLUTION OPHTHALMIC
Qty: 1 BOTTLE | Refills: 3 | Status: SHIPPED | OUTPATIENT
Start: 2018-03-13 | End: 2018-05-17 | Stop reason: ALTCHOICE

## 2018-03-13 ASSESSMENT — KERATOMETRY
OS_K1POWER_DIOPTERS: 40.25
OS_AXISANGLE_DEGREES: 080
OS_K2POWER_DIOPTERS: 40.75
OD_AXISANGLE_DEGREES: 086
OD_AXISANGLE2_DEGREES: 176
OD_K2POWER_DIOPTERS: 40.75
OS_AXISANGLE2_DEGREES: 170
OD_K1POWER_DIOPTERS: 40.25

## 2018-03-13 ASSESSMENT — REFRACTION_MANIFEST
OD_CYLINDER: DS
OS_SPHERE: +0.75
OS_CYLINDER: -0.25
OD_SPHERE: +0.25
OS_AXIS: 120

## 2018-03-13 ASSESSMENT — VISUAL ACUITY
METHOD: SNELLEN - LINEAR
OD_SC+: -1
OD_SC: 20/20
OS_SC: 20/20

## 2018-03-13 ASSESSMENT — SLIT LAMP EXAM - LIDS: COMMENTS: NORMAL

## 2018-03-15 ENCOUNTER — OFFICE VISIT (OUTPATIENT)
Dept: OPTOMETRY | Age: 38
End: 2018-03-15
Payer: COMMERCIAL

## 2018-03-15 DIAGNOSIS — B00.52 HERPES SIMPLEX KERATITIS OF RIGHT EYE: Primary | ICD-10-CM

## 2018-03-15 PROCEDURE — 99212 OFFICE O/P EST SF 10 MIN: CPT | Performed by: OPTOMETRIST

## 2018-03-15 ASSESSMENT — VISUAL ACUITY
METHOD: SNELLEN - LINEAR
OD_SC: 20/20
OS_SC: 20/20

## 2018-03-15 NOTE — PROGRESS NOTES
Sherman Moffett presents today for   Chief Complaint   Patient presents with    Follow-up   . HPI     2 day follow up herpes simplex keratitis  Eye is getting a little better, but she can tell when she needs her next set of eye drops. Her eye really starts to bother her when her drops wear off. Light is really starting to bother her more now. Viroptic 1 drop every 2 hours to right eye; last drop 10 am                Main Ophthalmology Exam     Slit Lamp Exam       Right Left    Lids/Lashes slightly irritated appearance      Conjunctiva/Sclera 2+ Injection     Cornea horizontal herpetic lesion slightly less swollen with staining      Anterior Chamber Deep and quiet     Iris Round and reactive     Lens Clear     Vitreous Normal                       Visual Acuity (Snellen - Linear)       Right Left    Dist sc 20/20 20/20         Not recorded                   1. Herpes simplex keratitis of right eye           Patient Instructions   Continue medication every 2 hours and if awaken in the night;  Until Monday;  Return on Monday and we will decide then to taper      Return in about 1 week (around 3/22/2018) for herpes simplex right eye .

## 2018-03-19 ENCOUNTER — OFFICE VISIT (OUTPATIENT)
Dept: OPTOMETRY | Age: 38
End: 2018-03-19
Payer: COMMERCIAL

## 2018-03-19 DIAGNOSIS — B00.52 HERPES SIMPLEX KERATITIS OF RIGHT EYE: Primary | ICD-10-CM

## 2018-03-19 PROCEDURE — 99212 OFFICE O/P EST SF 10 MIN: CPT | Performed by: OPTOMETRIST

## 2018-03-19 RX ORDER — BENOXINATE HCL/FLUORESCEIN SOD 0.4%-0.25%
1 DROPS OPHTHALMIC (EYE) ONCE
Status: COMPLETED | OUTPATIENT
Start: 2018-03-19 | End: 2018-03-19

## 2018-03-19 RX ADMIN — Medication 1 DROP: at 15:59

## 2018-03-19 ASSESSMENT — VISUAL ACUITY
OS_SC: 20/20
METHOD: SNELLEN - LINEAR
OD_SC: 20/20

## 2018-03-19 ASSESSMENT — SLIT LAMP EXAM - LIDS: COMMENTS: NORMAL

## 2018-03-26 ENCOUNTER — OFFICE VISIT (OUTPATIENT)
Dept: OPTOMETRY | Age: 38
End: 2018-03-26
Payer: COMMERCIAL

## 2018-03-26 DIAGNOSIS — B00.52 HERPES SIMPLEX KERATITIS, RIGHT EYE: Primary | ICD-10-CM

## 2018-03-26 PROCEDURE — 99212 OFFICE O/P EST SF 10 MIN: CPT | Performed by: OPTOMETRIST

## 2018-03-26 ASSESSMENT — VISUAL ACUITY
OS_SC: 20/20
METHOD: SNELLEN - LINEAR
OD_SC: 20/20

## 2018-03-26 ASSESSMENT — SLIT LAMP EXAM - LIDS: COMMENTS: NORMAL

## 2018-03-26 NOTE — PATIENT INSTRUCTIONS
Continue viroptic 4x per day in the right eye ; return sooner if vision changes for worse and don't feel improvement ; we will decide then if we can add steroid drop to improve the outcome with scarring

## 2018-04-03 ENCOUNTER — OFFICE VISIT (OUTPATIENT)
Dept: OPTOMETRY | Age: 38
End: 2018-04-03
Payer: COMMERCIAL

## 2018-04-03 DIAGNOSIS — H17.9 CORNEAL SCARRING: Primary | ICD-10-CM

## 2018-04-03 DIAGNOSIS — B00.52 HERPES SIMPLEX KERATITIS: ICD-10-CM

## 2018-04-03 PROCEDURE — 99212 OFFICE O/P EST SF 10 MIN: CPT | Performed by: OPTOMETRIST

## 2018-04-03 RX ORDER — PREDNISOLONE ACETATE 10 MG/ML
1 SUSPENSION/ DROPS OPHTHALMIC 4 TIMES DAILY
Qty: 1 BOTTLE | Refills: 2 | Status: SHIPPED | OUTPATIENT
Start: 2018-04-03 | End: 2018-04-17

## 2018-04-03 ASSESSMENT — SLIT LAMP EXAM - LIDS: COMMENTS: NORMAL

## 2018-04-03 ASSESSMENT — VISUAL ACUITY
OD_SC: 20/20
METHOD: SNELLEN - LINEAR
OS_SC: 20/20

## 2018-04-18 ENCOUNTER — OFFICE VISIT (OUTPATIENT)
Dept: OPTOMETRY | Age: 38
End: 2018-04-18
Payer: COMMERCIAL

## 2018-04-18 DIAGNOSIS — B00.52 HERPES SIMPLEX KERATITIS OF RIGHT EYE: Primary | ICD-10-CM

## 2018-04-18 PROCEDURE — 99212 OFFICE O/P EST SF 10 MIN: CPT | Performed by: OPTOMETRIST

## 2018-04-18 RX ORDER — BENOXINATE HCL/FLUORESCEIN SOD 0.4%-0.25%
1 DROPS OPHTHALMIC (EYE) ONCE
Status: COMPLETED | OUTPATIENT
Start: 2018-04-18 | End: 2018-04-18

## 2018-04-18 RX ADMIN — Medication 1 DROP: at 15:35

## 2018-04-18 ASSESSMENT — VISUAL ACUITY
OD_SC: 20/20
OS_SC: 20/20
METHOD: SNELLEN - LINEAR

## 2018-05-17 ENCOUNTER — OFFICE VISIT (OUTPATIENT)
Dept: OPTOMETRY | Age: 38
End: 2018-05-17
Payer: COMMERCIAL

## 2018-05-17 DIAGNOSIS — B00.52 HERPES SIMPLEX KERATITIS OF RIGHT EYE: Primary | ICD-10-CM

## 2018-05-17 PROCEDURE — 99212 OFFICE O/P EST SF 10 MIN: CPT | Performed by: OPTOMETRIST

## 2018-05-17 ASSESSMENT — VISUAL ACUITY
METHOD: SNELLEN - LINEAR
OS_SC: 20/20
OD_SC: 20/20

## 2018-05-17 ASSESSMENT — SLIT LAMP EXAM - LIDS: COMMENTS: NORMAL

## 2018-05-22 ENCOUNTER — OFFICE VISIT (OUTPATIENT)
Dept: OPTOMETRY | Age: 38
End: 2018-05-22
Payer: COMMERCIAL

## 2018-05-22 DIAGNOSIS — H52.02 HYPEROPIA OF LEFT EYE WITH ASTIGMATISM: Primary | ICD-10-CM

## 2018-05-22 DIAGNOSIS — H52.202 HYPEROPIA OF LEFT EYE WITH ASTIGMATISM: Primary | ICD-10-CM

## 2018-05-22 PROCEDURE — 92014 COMPRE OPH EXAM EST PT 1/>: CPT | Performed by: OPTOMETRIST

## 2018-05-22 ASSESSMENT — REFRACTION_MANIFEST
OS_SPHERE: +0.25
OD_SPHERE: PLANO
OS_AXIS: 150
OS_CYLINDER: -0.25
OD_CYLINDER: -0.25
OD_AXIS: 135

## 2018-05-22 ASSESSMENT — KERATOMETRY
OD_K2POWER_DIOPTERS: 41.25
OS_AXISANGLE_DEGREES: 089
OD_AXISANGLE_DEGREES: 061
OS_K2POWER_DIOPTERS: 41.00
OS_K1POWER_DIOPTERS: 40.50
OD_AXISANGLE2_DEGREES: 151
OD_K1POWER_DIOPTERS: 40.25
OS_AXISANGLE2_DEGREES: 179

## 2018-05-22 ASSESSMENT — VISUAL ACUITY
OD_SC: 20/20 OU
OD_SC: 20/20
METHOD: SNELLEN - LINEAR
OS_SC: 20/20

## 2018-05-22 ASSESSMENT — SLIT LAMP EXAM - LIDS
COMMENTS: NORMAL
COMMENTS: NORMAL

## 2018-05-22 ASSESSMENT — TONOMETRY
IOP_METHOD: APPLANATION W FLURESS DROP
OD_IOP_MMHG: 16
OS_IOP_MMHG: 16

## 2019-03-15 LAB
AVERAGE GLUCOSE: NORMAL
HBA1C MFR BLD: 5.8 %

## 2021-10-18 ENCOUNTER — OFFICE VISIT (OUTPATIENT)
Dept: OPTOMETRY | Age: 41
End: 2021-10-18
Payer: COMMERCIAL

## 2021-10-18 DIAGNOSIS — H52.4 HYPEROPIA OF BOTH EYES WITH ASTIGMATISM AND PRESBYOPIA: Primary | ICD-10-CM

## 2021-10-18 DIAGNOSIS — E11.9 NON-INSULIN DEPENDENT TYPE 2 DIABETES MELLITUS (HCC): ICD-10-CM

## 2021-10-18 DIAGNOSIS — H52.03 HYPEROPIA OF BOTH EYES WITH ASTIGMATISM AND PRESBYOPIA: Primary | ICD-10-CM

## 2021-10-18 DIAGNOSIS — H52.203 HYPEROPIA OF BOTH EYES WITH ASTIGMATISM AND PRESBYOPIA: Primary | ICD-10-CM

## 2021-10-18 PROCEDURE — 92014 COMPRE OPH EXAM EST PT 1/>: CPT | Performed by: OPTOMETRIST

## 2021-10-18 PROCEDURE — 92250 FUNDUS PHOTOGRAPHY W/I&R: CPT | Performed by: OPTOMETRIST

## 2021-10-18 RX ORDER — PAROXETINE HYDROCHLORIDE 20 MG/1
TABLET, FILM COATED ORAL
COMMUNITY
Start: 2021-10-11

## 2021-10-18 RX ORDER — TROPICAMIDE 10 MG/ML
1 SOLUTION/ DROPS OPHTHALMIC ONCE
Status: COMPLETED | OUTPATIENT
Start: 2021-10-18 | End: 2021-10-18

## 2021-10-18 RX ADMIN — TROPICAMIDE 1 DROP: 10 SOLUTION/ DROPS OPHTHALMIC at 14:56

## 2021-10-18 ASSESSMENT — REFRACTION_MANIFEST
OS_CYLINDER: -0.25
OD_SPHERE: +0.25
OS_ADD: +1.00
OD_CYLINDER: DS
OS_AXIS: 130
OD_ADD: +1.00
OS_SPHERE: +0.50

## 2021-10-18 ASSESSMENT — REFRACTION_WEARINGRX
OS_SPHERE: +0.25
OS_CYLINDER: -0.25
OD_SPHERE: PLANO
OS_AXIS: 150
OD_AXIS: 135
OD_CYLINDER: -0.25
SPECS_TYPE: NO RX NEEDED

## 2021-10-18 ASSESSMENT — KERATOMETRY
OD_K1POWER_DIOPTERS: 40.25
OD_AXISANGLE2_DEGREES: 161
OS_AXISANGLE_DEGREES: 086
OS_K1POWER_DIOPTERS: 40.50
METHOD_AUTO_MANUAL: AUTOMATED
OD_AXISANGLE_DEGREES: 071
OD_K2POWER_DIOPTERS: 41.00
OS_K2POWER_DIOPTERS: 41.00
OS_AXISANGLE2_DEGREES: 176

## 2021-10-18 ASSESSMENT — VISUAL ACUITY
OS_SC: 20/20
OD_SC: 20/20
METHOD: SNELLEN - LINEAR
OD_SC: 20/20 OU

## 2021-10-18 ASSESSMENT — ENCOUNTER SYMPTOMS
ALLERGIC/IMMUNOLOGIC NEGATIVE: 0
RESPIRATORY NEGATIVE: 0
EYES NEGATIVE: 0
GASTROINTESTINAL NEGATIVE: 0

## 2021-10-18 ASSESSMENT — TONOMETRY
OD_IOP_MMHG: 16
OS_IOP_MMHG: 16
IOP_METHOD: NON-CONTACT AIR PUFF

## 2021-10-18 NOTE — PROGRESS NOTES
Charissa Friend presents today for   Chief Complaint   Patient presents with    Blurred Vision    Ophth Diabetic Exam    Vision Exam   .    HPI     Blurred Vision     In both eyes. Vision is blurred. Comments     Last Vision Exam: 5/22/2018 Aw  Last Ophthalmology Exam: 2014 [facility]  Last Filled Glasses Rx: n/a  Insurance: Wolf Min  Update: Dm Exam; Glasses if needed  No rx was needed at last exam  If things are too close she has to move them farther way to read them  Distance seems to be ok  Has been having more headaches ; eyes hurt throughout the day. Diabetic:  Sugars:    HmgA1C: 6.4  5/12/2021                   Current Outpatient Medications   Medication Sig Dispense Refill    PARoxetine (PAXIL) 20 MG tablet       Progesterone 200 MG SUPP Place vaginally      aspirin 81 MG tablet Take 81 mg by mouth daily      Prenatal Vit-Fe Fumarate-FA (PRENATAL PLUS) 27-1 MG TABS Take 1 tablet by mouth daily 90 tablet 3    insulin aspart (NOVOLOG FLEXPEN) 100 UNIT/ML injection pen Inject 14 Units into the skin Daily with supper  (Patient not taking: Reported on 10/18/2021)      insulin NPH (HUMULIN N;NOVOLIN N) 100 UNIT/ML injection vial Inject 24 Units into the skin nightly  (Patient not taking: Reported on 10/18/2021)       No current facility-administered medications for this visit.      ROS     Positive for: Endocrine    Negative for: Constitutional, Gastrointestinal, Neurological, Skin, Genitourinary, Musculoskeletal, HENT, Cardiovascular, Eyes, Respiratory, Psychiatric, Allergic/Imm, Heme/Lymph          Family History   Problem Relation Age of Onset    Diabetes Mother         (borderline)    High Blood Pressure Mother     Diabetes Father     Heart Disease Father     Cataracts Father     Diabetes Brother     Breast Cancer Maternal Grandmother     Diabetes Maternal Grandmother     Diabetes Maternal Grandfather     Diabetes Paternal Grandmother     COPD Paternal Grandfather     Ovarian Cancer Other 28        Paternal cousin    Glaucoma Neg Hx      Social History     Socioeconomic History    Marital status: Single     Spouse name: None    Number of children: None    Years of education: None    Highest education level: None   Occupational History    None   Tobacco Use    Smoking status: Never Smoker    Smokeless tobacco: Never Used   Vaping Use    Vaping Use: Never used   Substance and Sexual Activity    Alcohol use: No    Drug use: No    Sexual activity: Yes     Partners: Male   Other Topics Concern    None   Social History Narrative    None     Social Determinants of Health     Financial Resource Strain:     Difficulty of Paying Living Expenses:    Food Insecurity:     Worried About Running Out of Food in the Last Year:     Ran Out of Food in the Last Year:    Transportation Needs:     Lack of Transportation (Medical):  Lack of Transportation (Non-Medical):    Physical Activity:     Days of Exercise per Week:     Minutes of Exercise per Session:    Stress:     Feeling of Stress :    Social Connections:     Frequency of Communication with Friends and Family:     Frequency of Social Gatherings with Friends and Family:     Attends Tenriism Services:     Active Member of Clubs or Organizations:     Attends Club or Organization Meetings:     Marital Status:    Intimate Partner Violence:     Fear of Current or Ex-Partner:     Emotionally Abused:     Physically Abused:     Sexually Abused:        Past Medical History:   Diagnosis Date    Anxiety     Bell's palsy     Constipation     Furuncles     (external genitalia).  GERD (gastroesophageal reflux disease)     History of chronic cholecystitis     (with cholelithiasis).     History of kidney stones     Hyperlipidemia          Main Ophthalmology Exam     External Exam       Right Left    External Normal Normal                  Tonometry     Tonometry (Non-contact air puff, 2:28 PM)       Right Left    Pressure 16 16 IOPg:                 CH:              WS:                              Visual Acuity (Snellen - Linear)       Right Left    Dist sc 20/20 20/20    Near sc 20/20 ou         Keratometry     Keratometry (Automated)       K1 Axis K2 Axis    Right 40.25 161 41.00 071    Left 40.50 176 41.00 086              Pupils     Pupils       Pupils    Right PERRL    Left PERRL               Not recorded         Not recorded          Ophthalmology Exam     Wearing Rx       Sphere Cylinder Axis    Right [place] -0.25 135    Left +0.25 -0.25 150    Age: 3yrs    Type: no rx needed                 Manifest Refraction     Manifest Refraction       Sphere Cylinder Axis Dist [place] Add    Right +0.25 ds  20/20 +1.00    Left +0.50 -0.25 130 20/20 +1.00          Manifest Refraction #2 (Auto)       Sphere Cylinder Axis Dist VA Add    Right +0.50 0.00 000      Left +0.75 -0.25 124                 Final Rx       Sphere Cylinder Axis Add    Right +0.25 ds  +1.00    Left +0.50 -0.25 130 +1.00    Expiration Date: 10/19/2023          Neuro/Psych     Neuro/Psych     Oriented x3: Yes    Mood/Affect: Normal                Orders Placed This Encounter   Procedures     DIABETES EYE EXAM    Color Fundus Photography-OU-Both Eyes       IMPRESSION:  1. Hyperopia of both eyes with astigmatism and presbyopia    2. Non-insulin dependent type 2 diabetes mellitus ([address]        PLAN:  1. New glasses recommended   2. Discussed the patient's diagnosis of diabetes and the impact this can have on their ocular health, potentially even leading to permanent blindness. I discussed with the patient the importance of continued follow-up and management with their primary care physician to control their glycemic, blood pressure, and lipid levels. The patient verbalized understanding. Glycemic control as per PCP   Patient Instructions   New glasses recommended;   We will fill with bifocal because of work etc.    If want to get over the counter readers: +1.25 is suggested     Return in about 2 years (around 10/18/2023) for complete eye exam.

## 2021-10-18 NOTE — PATIENT INSTRUCTIONS
New glasses recommended;   We will fill with bifocal because of work etc.    If want to get over the counter readers: +1.25 is suggested

## 2022-03-23 ENCOUNTER — OFFICE VISIT (OUTPATIENT)
Dept: OPTOMETRY | Age: 42
End: 2022-03-23
Payer: COMMERCIAL

## 2022-03-23 DIAGNOSIS — B00.52 HERPES SIMPLEX KERATITIS OF RIGHT EYE: Primary | ICD-10-CM

## 2022-03-23 PROCEDURE — 99214 OFFICE O/P EST MOD 30 MIN: CPT | Performed by: OPTOMETRIST

## 2022-03-23 PROCEDURE — 99211 OFF/OP EST MAY X REQ PHY/QHP: CPT

## 2022-03-23 RX ORDER — TRIFLURIDINE 10 MG/ML
SOLUTION OPHTHALMIC
Qty: 1 EACH | Refills: 3 | Status: SHIPPED | OUTPATIENT
Start: 2022-03-23 | End: 2022-06-28 | Stop reason: ALTCHOICE

## 2022-03-23 ASSESSMENT — VISUAL ACUITY
CORRECTION_TYPE: GLASSES
OD_CC+: -1
METHOD: SNELLEN - LINEAR
OS_CC: 20/20

## 2022-03-23 ASSESSMENT — TONOMETRY
OD_IOP_MMHG: 18
IOP_METHOD: NON-CONTACT AIR PUFF
OS_IOP_MMHG: 17

## 2022-03-23 ASSESSMENT — ENCOUNTER SYMPTOMS
RESPIRATORY NEGATIVE: 0
GASTROINTESTINAL NEGATIVE: 0
EYES NEGATIVE: 0
ALLERGIC/IMMUNOLOGIC NEGATIVE: 0

## 2022-03-23 ASSESSMENT — REFRACTION_WEARINGRX
OD_ADD: +1.00
OS_SPHERE: +0.50
OD_CYLINDER: DS
OS_CYLINDER: -0.25
OD_SPHERE: +0.25
SPECS_TYPE: PAL
OS_ADD: +1.00
OS_AXIS: 130

## 2022-03-23 NOTE — PROGRESS NOTES
Meryl Lorenzana presents today for   Chief Complaint   Patient presents with    Eye Problem    Red Eye   . HPI     Right eye has been bothering her for about a week  Was painting and thought she had something in it. Nothing found. Eye has been watering every day , blurry off and on, swollen/puffy and eye has been mattered in the morning. X 1 week           Current Outpatient Medications   Medication Sig Dispense Refill    trifluridine (VIROPTIC) 1 % ophthalmic solution 1 drop every 2 hours for 3 days ; then 1 drop 4 times daily for 7 days 1 each 3    PARoxetine (PAXIL) 20 MG tablet       Progesterone 200 MG SUPP Place vaginally      insulin NPH (HUMULIN N;NOVOLIN N) 100 UNIT/ML injection vial Inject 24 Units into the skin nightly       aspirin 81 MG tablet Take 81 mg by mouth daily      Prenatal Vit-Fe Fumarate-FA (PRENATAL PLUS) 27-1 MG TABS Take 1 tablet by mouth daily 90 tablet 3    insulin aspart (NOVOLOG FLEXPEN) 100 UNIT/ML injection pen Inject 14 Units into the skin Daily with supper  (Patient not taking: Reported on 10/18/2021)       No current facility-administered medications for this visit.          Family History   Problem Relation Age of Onset    Diabetes Mother         (borderline)    High Blood Pressure Mother     Diabetes Father     Heart Disease Father     Cataracts Father     Diabetes Brother     Breast Cancer Maternal Grandmother     Diabetes Maternal Grandmother     Diabetes Maternal Grandfather     Diabetes Paternal Grandmother     COPD Paternal Grandfather     Ovarian Cancer Other 28        Paternal cousin    Glaucoma Neg Hx      Social History     Socioeconomic History    Marital status: Single     Spouse name: None    Number of children: None    Years of education: None    Highest education level: None   Occupational History    None   Tobacco Use    Smoking status: Never Smoker    Smokeless tobacco: Never Used   Vaping Use    Vaping Use: Never used   Substance and Sexual Activity    Alcohol use: No    Drug use: No    Sexual activity: Yes     Partners: Male   Other Topics Concern    None   Social History Narrative    None     Social Determinants of Health     Financial Resource Strain:     Difficulty of Paying Living Expenses: Not on file   Food Insecurity:     Worried About Running Out of Food in the Last Year: Not on file    Jeannine of Food in the Last Year: Not on file   Transportation Needs:     Lack of Transportation (Medical): Not on file    Lack of Transportation (Non-Medical): Not on file   Physical Activity:     Days of Exercise per Week: Not on file    Minutes of Exercise per Session: Not on file   Stress:     Feeling of Stress : Not on file   Social Connections:     Frequency of Communication with Friends and Family: Not on file    Frequency of Social Gatherings with Friends and Family: Not on file    Attends Spiritism Services: Not on file    Active Member of [address] [place] [address] or Organizations: Not on file    Attends Club or Organization Meetings: Not on file    Marital Status: Not on file   Intimate Partner Violence:     Fear of Current or Ex-Partner: Not on file    Emotionally Abused: Not on file    Physically Abused: Not on file    Sexually Abused: Not on file   Housing Stability:     Unable to Pay for Housing in the Last Year: Not on file    Number of [place] in the Last Year: Not on file    Unstable Housing in the Last Year: Not on file     Past Medical History:   Diagnosis Date    Anxiety     Bell's palsy     Constipation     Furuncles     (external genitalia).  GERD (gastroesophageal reflux disease)     History of chronic cholecystitis     (with cholelithiasis).     History of kidney stones     Hyperlipidemia        ROS     Negative for: Constitutional, Gastrointestinal, Neurological, Skin, Genitourinary, Musculoskeletal, HENT, Endocrine, Cardiovascular, Eyes, Respiratory, Psychiatric, Allergic/Imm, Heme/Lymph          Main Ophthalmology Exam     External Exam       Right Left    External Normal           Slit Lamp Exam       Right Left    Lids/Lashes some edema upper and lower      Conjunctiva/Sclera 2+ Chemosis, 2+ Injection     Cornea simplex lesion;  staining with classic end bulbs      Anterior Chamber Deep and quiet     Iris Round and reactive              <div id=\"MAIN_EXAM_REVIEWED\"></div>      Tonometry     Tonometry (Non-contact air puff, 10:51 AM)       Right Left    Pressure 18 17   IOP.7             16.2  CH:  9.8          10.1  WS: 6.0          7.1                 Not recorded       Not recorded         Visual Acuity (Snellen - Linear)       Right Left    Dist cc 20/25 -1 20/20    Correction: Glasses          Not recorded       Not recorded       Not recorded           Ophthalmology Exam     Wearing Rx       Sphere Cylinder Axis Add    Right +0.25 ds  +1.00    Left +0.50 -0.25 130 +1.00    Age: 6m    Type: PAL              Not recorded              No orders of the defined types were placed in this encounter. IMPRESSION:  1. Herpes simplex keratitis of right eye        PLAN:    1. Use medication as prescribed  1 drop every 2 hours for 3 days, and then 4x per day until seen back on Monday. There are no Patient Instructions on file for this visit. Return in about 5 days (around 3/28/2022).

## 2022-03-28 ENCOUNTER — OFFICE VISIT (OUTPATIENT)
Dept: OPTOMETRY | Age: 42
End: 2022-03-28
Payer: COMMERCIAL

## 2022-03-28 DIAGNOSIS — B00.52 HERPES SIMPLEX KERATITIS OF RIGHT EYE: Primary | ICD-10-CM

## 2022-03-28 PROCEDURE — 99213 OFFICE O/P EST LOW 20 MIN: CPT | Performed by: OPTOMETRIST

## 2022-03-28 PROCEDURE — 99211 OFF/OP EST MAY X REQ PHY/QHP: CPT

## 2022-03-28 ASSESSMENT — VISUAL ACUITY
OD_SC+: -1
OS_SC: 20/20
OD_SC: 20/20
METHOD: SNELLEN - LINEAR

## 2022-03-28 ASSESSMENT — ENCOUNTER SYMPTOMS
EYES NEGATIVE: 0
GASTROINTESTINAL NEGATIVE: 0
RESPIRATORY NEGATIVE: 0
ALLERGIC/IMMUNOLOGIC NEGATIVE: 0

## 2022-03-28 ASSESSMENT — TONOMETRY
OS_IOP_MMHG: 24
OD_IOP_MMHG: 20
IOP_METHOD: NON-CONTACT AIR PUFF

## 2022-03-28 ASSESSMENT — SLIT LAMP EXAM - LIDS: COMMENTS: NORMAL

## 2022-03-28 NOTE — PROGRESS NOTES
Cooper Nguyen presents today for   Chief Complaint   Patient presents with    Eye Problem   . HPI     5 day follow up to recheck Herpes simplex of right eye  Viroptic 1 drop every 2 hours for 3 days then 1 drop 4 x daily for 7 days  Still sensitive to light when she is outside. States eye is feeling better, but not back to 100% just yet. Current Outpatient Medications   Medication Sig Dispense Refill    trifluridine (VIROPTIC) 1 % ophthalmic solution 1 drop every 2 hours for 3 days ; then 1 drop 4 times daily for 7 days 1 each 3    PARoxetine (PAXIL) 20 MG tablet       Progesterone 200 MG SUPP Place vaginally      insulin aspart (NOVOLOG FLEXPEN) 100 UNIT/ML injection pen Inject 14 Units into the skin Daily with supper Indications: 4 units before breakfast 14 units before dinner       insulin NPH (HUMULIN N;NOVOLIN N) 100 UNIT/ML injection vial Inject 24 Units into the skin nightly       aspirin 81 MG tablet Take 81 mg by mouth daily      Prenatal Vit-Fe Fumarate-FA (PRENATAL PLUS) 27-1 MG TABS Take 1 tablet by mouth daily 90 tablet 3     No current facility-administered medications for this visit.          Family History   Problem Relation Age of Onset    Diabetes Mother         (borderline)    High Blood Pressure Mother     Diabetes Father     Heart Disease Father     Cataracts Father     Diabetes Brother     Breast Cancer Maternal Grandmother     Diabetes Maternal Grandmother     Diabetes Maternal Grandfather     Diabetes Paternal Grandmother     COPD Paternal Grandfather     Ovarian Cancer Other 28        Paternal cousin    Glaucoma Neg Hx      Social History     Socioeconomic History    Marital status: Single     Spouse name: None    Number of children: None    Years of education: None    Highest education level: None   Occupational History    None   Tobacco Use    Smoking status: Never Smoker    Smokeless tobacco: Never Used   Vaping Use    Vaping Use: Never used Substance and Sexual Activity    Alcohol use: No    Drug use: No    Sexual activity: Yes     Partners: Male   Other Topics Concern    None   Social History Narrative    None     Social Determinants of Health     Financial Resource Strain:     Difficulty of Paying Living Expenses: Not on file   Food Insecurity:     Worried About Running Out of Food in the Last Year: Not on file    Jeannine of Food in the Last Year: Not on file   Transportation Needs:     Lack of Transportation (Medical): Not on file    Lack of Transportation (Non-Medical): Not on file   Physical Activity:     Days of Exercise per Week: Not on file    Minutes of Exercise per Session: Not on file   Stress:     Feeling of Stress : Not on file   Social Connections:     Frequency of Communication with Friends and Family: Not on file    Frequency of Social Gatherings with Friends and Family: Not on file    Attends Yazdanism Services: Not on file    Active Member of [address] [place] [address] or Organizations: Not on file    Attends Club or Organization Meetings: Not on file    Marital Status: Not on file   Intimate Partner Violence:     Fear of Current or Ex-Partner: Not on file    Emotionally Abused: Not on file    Physically Abused: Not on file    Sexually Abused: Not on file   Housing Stability:     Unable to Pay for Housing in the Last Year: Not on file    Number of [place] in the Last Year: Not on file    Unstable Housing in the Last Year: Not on file     Past Medical History:   Diagnosis Date    Anxiety     Bell's palsy     Constipation     Furuncles     (external genitalia).  GERD (gastroesophageal reflux disease)     History of chronic cholecystitis     (with cholelithiasis).     History of kidney stones     Hyperlipidemia        ROS     Negative for: Constitutional, Gastrointestinal, Neurological, Skin, Genitourinary, Musculoskeletal, HENT, Endocrine, Cardiovascular, Eyes, Respiratory, Psychiatric, Allergic/Imm, Heme/Lymph Main Ophthalmology Exam     External Exam       Right Left    External Normal           Slit Lamp Exam       Right Left    Lids/Lashes Normal     Conjunctiva/Sclera 1+  Chemosis, 1+ injection      Cornea lesion is only staining at end bulbs and more superficial and spread out      Anterior Chamber Deep and quiet     Iris Round and reactive              <div id=\"MAIN_EXAM_REVIEWED\"></div>      Tonometry     Tonometry (Non-contact air puff, 9:03 AM)       Right Left    Pressure 20 24   IOP.8             21.7  CH:  9.9          8.4  WS: 8.2          5.1                 Not recorded       Not recorded         Visual Acuity (Snellen - Linear)       Right Left    Dist sc 20/20 -1 20/20            Staining with fluoroscein revealed slight dryness nasally in the left eye with no herpetic lesions; The right eye shows herpetic lesion that is only staining at the end bulbs and appears 50% better than at last visit, with no edema of the lids and injection 1+ which is also improved. Some tearing was noted in the right eye                     No orders of the defined types were placed in this encounter. IMPRESSION:  1. Herpes simplex keratitis of right eye        PLAN:    1. Continue QID in the right eye;  Return in one week or sooner if any changes in the vision or feel worsening of the symptoms       There are no Patient Instructions on file for this visit. Return in about 1 week (around 2022) for HSV right eye .

## 2022-04-04 ENCOUNTER — OFFICE VISIT (OUTPATIENT)
Dept: OPTOMETRY | Age: 42
End: 2022-04-04
Payer: COMMERCIAL

## 2022-04-04 DIAGNOSIS — B00.52 HERPES SIMPLEX KERATITIS OF RIGHT EYE: Primary | ICD-10-CM

## 2022-04-04 PROCEDURE — 99213 OFFICE O/P EST LOW 20 MIN: CPT | Performed by: OPTOMETRIST

## 2022-04-04 ASSESSMENT — TONOMETRY
OD_IOP_MMHG: 19
OS_IOP_MMHG: 13
IOP_METHOD: NON-CONTACT AIR PUFF

## 2022-04-04 ASSESSMENT — VISUAL ACUITY
OD_SC: 20/20
OS_SC: 20/20
METHOD: SNELLEN - LINEAR

## 2022-04-04 ASSESSMENT — ENCOUNTER SYMPTOMS: EYES NEGATIVE: 1

## 2022-04-04 NOTE — PATIENT INSTRUCTIONS
Continue 4x per day viroptic in the right eye    Add artificial tears PF ever 2 hours, not within 15 minutes of virotpic

## 2022-04-04 NOTE — PROGRESS NOTES
Yady Maradiaga presents today for   Chief Complaint   Patient presents with    2 Week Follow-Up   . HPI     Follow up, HSV keratitis right eye  Doing some what better, not much change. Right eye is irritated/dry and sensitive to light. Right eye is always worse first thing in the mornings and improves some as the day goes. She is using viroptic drops 4 times a day in the right eye. ; still no problems in the left eye b         Current Outpatient Medications   Medication Sig Dispense Refill    trifluridine (VIROPTIC) 1 % ophthalmic solution 1 drop every 2 hours for 3 days ; then 1 drop 4 times daily for 7 days 1 each 3    PARoxetine (PAXIL) 20 MG tablet       Progesterone 200 MG SUPP Place vaginally      insulin aspart (NOVOLOG FLEXPEN) 100 UNIT/ML injection pen Inject 14 Units into the skin Daily with supper Indications: 4 units before breakfast 14 units before dinner       insulin NPH (HUMULIN N;NOVOLIN N) 100 UNIT/ML injection vial Inject 24 Units into the skin nightly       aspirin 81 MG tablet Take 81 mg by mouth daily      Prenatal Vit-Fe Fumarate-FA (PRENATAL PLUS) 27-1 MG TABS Take 1 tablet by mouth daily 90 tablet 3     No current facility-administered medications for this visit.          Family History   Problem Relation Age of Onset    Diabetes Mother         (borderline)    High Blood Pressure Mother     Diabetes Father     Heart Disease Father     Cataracts Father     Diabetes Brother     Breast Cancer Maternal Grandmother     Diabetes Maternal Grandmother     Diabetes Maternal Grandfather     Diabetes Paternal Grandmother     COPD Paternal Grandfather     Ovarian Cancer Other 28        Paternal cousin    Glaucoma Neg Hx      Social History     Socioeconomic History    Marital status: Single     Spouse name: None    Number of children: None    Years of education: None    Highest education level: None   Occupational History    None   Tobacco Use    Smoking status: Never Smoker  Smokeless tobacco: Never Used   Vaping Use    Vaping Use: Never used   Substance and Sexual Activity    Alcohol use: No    Drug use: No    Sexual activity: Yes     Partners: Male   Other Topics Concern    None   Social History Narrative    None     Social Determinants of Health     Financial Resource Strain:     Difficulty of Paying Living Expenses: Not on file   Food Insecurity:     Worried About Running Out of Food in the Last Year: Not on file    Jeannine of Food in the Last Year: Not on file   Transportation Needs:     Lack of Transportation (Medical): Not on file    Lack of Transportation (Non-Medical): Not on file   Physical Activity:     Days of Exercise per Week: Not on file    Minutes of Exercise per Session: Not on file   Stress:     Feeling of Stress : Not on file   Social Connections:     Frequency of Communication with Friends and Family: Not on file    Frequency of Social Gatherings with Friends and Family: Not on file    Attends Taoism Services: Not on file    Active Member of [address] [place] [address] or Organizations: Not on file    Attends Club or Organization Meetings: Not on file    Marital Status: Not on file   Intimate Partner Violence:     Fear of Current or Ex-Partner: Not on file    Emotionally Abused: Not on file    Physically Abused: Not on file    Sexually Abused: Not on file   Housing Stability:     Unable to Pay for Housing in the Last Year: Not on file    Number of [place] in the Last Year: Not on file    Unstable Housing in the Last Year: Not on file     Past Medical History:   Diagnosis Date    Anxiety     Bell's palsy     Constipation     Furuncles     (external genitalia).  GERD (gastroesophageal reflux disease)     History of chronic cholecystitis     (with cholelithiasis).     History of kidney stones     Hyperlipidemia        ROS     Positive for: Eyes          Main Ophthalmology Exam     Slit Lamp Exam       Right Left    Cornea some staining and some epithelial erosion               <div id=\"MAIN_EXAM_REVIEWED\"></div>      Tonometry     Tonometry (Non-contact air puff, 10:50 AM)       Right Left    Pressure 19 13      Right / Left  IOPg 16.6 / 21.5  CH 8.9 / 17.9  WS 7.1 / 2.3                   Not recorded       Not recorded         Visual Acuity (Snellen - Linear)       Right Left    Dist sc 20/20 20/20          Not recorded       Not recorded       Not recorded           Not recorded       Not recorded              No orders of the defined types were placed in this encounter. IMPRESSION:  1. Herpes simplex keratitis of right eye        PLAN:    1. Continue 4x per day viroptic in the right eye    Add artificial tears PF ever 2 hours, not within 15 minutes of virotpic    Patient Instructions   Continue 4x per day viroptic in the right eye    Add artificial tears PF ever 2 hours, not within 15 minutes of virotpic      Return in about 1 week (around 4/11/2022).     If not better we may add antibiotic drop

## 2022-04-12 ENCOUNTER — OFFICE VISIT (OUTPATIENT)
Dept: OPTOMETRY | Age: 42
End: 2022-04-12
Payer: COMMERCIAL

## 2022-04-12 DIAGNOSIS — B00.52 HERPES SIMPLEX KERATITIS OF RIGHT EYE: Primary | ICD-10-CM

## 2022-04-12 PROCEDURE — 99213 OFFICE O/P EST LOW 20 MIN: CPT | Performed by: OPTOMETRIST

## 2022-04-12 RX ORDER — POLYMYXIN B SULFATE AND TRIMETHOPRIM 1; 10000 MG/ML; [USP'U]/ML
1 SOLUTION OPHTHALMIC 3 TIMES DAILY
Qty: 1 EACH | Refills: 0 | Status: SHIPPED | OUTPATIENT
Start: 2022-04-12 | End: 2022-04-22

## 2022-04-12 ASSESSMENT — VISUAL ACUITY
OS_CC: 20/20
CORRECTION_TYPE: GLASSES
METHOD: SNELLEN - LINEAR

## 2022-04-12 ASSESSMENT — ENCOUNTER SYMPTOMS
EYES NEGATIVE: 1
RESPIRATORY NEGATIVE: 0
ALLERGIC/IMMUNOLOGIC NEGATIVE: 0
GASTROINTESTINAL NEGATIVE: 0

## 2022-04-12 ASSESSMENT — TONOMETRY
OS_IOP_MMHG: 26
OD_IOP_MMHG: 18
IOP_METHOD: NON-CONTACT AIR PUFF

## 2022-04-13 ASSESSMENT — SLIT LAMP EXAM - LIDS: COMMENTS: NORMAL

## 2022-04-13 NOTE — PROGRESS NOTES
Yady Maradiaga presents today for   Chief Complaint   Patient presents with    2 Week Follow-Up   . HPI     1 week follow up HSV keratitis, right eye  4x per day viroptic in the right eye and artificial tears PF every 2 hours, not within 15 minutes of virotpic  States that last thursday her right eye started to water a lot again, so she started to rotate her eye drops every 2 hours between the viroptic and artifical tears. Right eye does feel better since thursday, but not back to 100% yet. A little more than 4x per day          Current Outpatient Medications   Medication Sig Dispense Refill    trimethoprim-polymyxin b (POLYTRIM) 87190-4.1 UNIT/ML-% ophthalmic solution Place 1 drop into the right eye 3 times daily for 10 days 1 each 0    trifluridine (VIROPTIC) 1 % ophthalmic solution 1 drop every 2 hours for 3 days ; then 1 drop 4 times daily for 7 days 1 each 3    PARoxetine (PAXIL) 20 MG tablet       Progesterone 200 MG SUPP Place vaginally      insulin aspart (NOVOLOG FLEXPEN) 100 UNIT/ML injection pen Inject 14 Units into the skin Daily with supper Indications: 4 units before breakfast 14 units before dinner       insulin NPH (HUMULIN N;NOVOLIN N) 100 UNIT/ML injection vial Inject 24 Units into the skin nightly       aspirin 81 MG tablet Take 81 mg by mouth daily      Prenatal Vit-Fe Fumarate-FA (PRENATAL PLUS) 27-1 MG TABS Take 1 tablet by mouth daily 90 tablet 3     No current facility-administered medications for this visit.          Family History   Problem Relation Age of Onset    Diabetes Mother         (borderline)    High Blood Pressure Mother     Diabetes Father     Heart Disease Father     Cataracts Father     Diabetes Brother     Breast Cancer Maternal Grandmother     Diabetes Maternal Grandmother     Diabetes Maternal Grandfather     Diabetes Paternal Grandmother     COPD Paternal Grandfather     Ovarian Cancer Other 28        Paternal cousin    Glaucoma Neg Hx      Social History     Socioeconomic History    Marital status: Single     Spouse name: None    Number of children: None    Years of education: None    Highest education level: None   Occupational History    None   Tobacco Use    Smoking status: Never Smoker    Smokeless tobacco: Never Used   Vaping Use    Vaping Use: Never used   Substance and Sexual Activity    Alcohol use: No    Drug use: No    Sexual activity: Yes     Partners: Male   Other Topics Concern    None   Social History Narrative    None     Social Determinants of Health     Financial Resource Strain:     Difficulty of Paying Living Expenses: Not on file   Food Insecurity:     Worried About Running Out of Food in the Last Year: Not on file    Jeannine of Food in the Last Year: Not on file   Transportation Needs:     Lack of Transportation (Medical): Not on file    Lack of Transportation (Non-Medical): Not on file   Physical Activity:     Days of Exercise per Week: Not on file    Minutes of Exercise per Session: Not on file   Stress:     Feeling of Stress : Not on file   Social Connections:     Frequency of Communication with Friends and Family: Not on file    Frequency of Social Gatherings with Friends and Family: Not on file    Attends [employer] Services: Not on file    Active Member of [address] [place] [address] [employer] or Organizations: Not on file    Attends Club or Organization Meetings: Not on file    Marital Status: Not on file   Intimate Partner Violence:     Fear of Current or Ex-Partner: Not on file    Emotionally Abused: Not on file    Physically Abused: Not on file    Sexually Abused: Not on file   Housing Stability:     Unable to Pay for Housing in the Last Year: Not on file    Number of [place] in the Last Year: Not on file    Unstable Housing in the Last Year: Not on file     Past Medical History:   Diagnosis Date    Anxiety     Bell's palsy     Constipation     Furuncles     (external genitalia).     GERD (gastroesophageal reflux disease)     History of chronic cholecystitis     (with cholelithiasis).  History of kidney stones     Hyperlipidemia        ROS     Positive for: Eyes    Negative for: Constitutional, Gastrointestinal, Neurological, Skin, Genitourinary, Musculoskeletal, HENT, Endocrine, Cardiovascular, Respiratory, Psychiatric, Allergic/Imm, Heme/Lymph          Main Ophthalmology Exam     External Exam       Right Left    External Normal           Slit Lamp Exam       Right Left    Lids/Lashes Normal     Conjunctiva/Sclera White and quiet     Cornea some staining hoizontally;  may be some erosion surrounding lesion      Anterior Chamber Deep and quiet              <div id=\"MAIN_EXAM_REVIEWED\"></div>      Tonometry     Tonometry (Non-contact air puff, 9:01 AM)       Right Left    Pressure 18 26   IOP.1             25.7  CH:  9.3          9.5  WS: 7.7          6.6                 Not recorded       Not recorded         Visual Acuity (Snellen - Linear)       Right Left    Dist cc 20/20 20/20    Correction: Glasses          Not recorded       Not recorded       Not recorded           Not recorded       Not recorded              No orders of the defined types were placed in this encounter. IMPRESSION:  1. Herpes simplex keratitis of right eye        PLAN:    1. Continue viroptic 4-6 x per day; Also add polytrim 3x per day alternating; May use artificial tears as needed   May stop viroptic on follow up over the weekend to see if there is a toxic component from the viroptic not allowing to heal;  We will look closely at previous episode for guidance     There are no Patient Instructions on file for this visit. Return in about 2 days (around 2022) for simplex right eye .

## 2022-04-14 ENCOUNTER — OFFICE VISIT (OUTPATIENT)
Dept: OPTOMETRY | Age: 42
End: 2022-04-14
Payer: COMMERCIAL

## 2022-04-14 DIAGNOSIS — B00.52 HERPES SIMPLEX KERATITIS OF RIGHT EYE: Primary | ICD-10-CM

## 2022-04-14 PROCEDURE — 99213 OFFICE O/P EST LOW 20 MIN: CPT | Performed by: OPTOMETRIST

## 2022-04-14 ASSESSMENT — TONOMETRY
OD_IOP_MMHG: 22
OS_IOP_MMHG: 21
IOP_METHOD: NON-CONTACT AIR PUFF

## 2022-04-14 ASSESSMENT — REFRACTION_WEARINGRX
OD_CYLINDER: DS
SPECS_TYPE: PAL
OS_ADD: +1.00
OD_SPHERE: +0.25
OS_SPHERE: +0.50
OD_ADD: +1.00
OS_AXIS: 130
OS_CYLINDER: -0.25

## 2022-04-14 ASSESSMENT — SLIT LAMP EXAM - LIDS: COMMENTS: NORMAL

## 2022-04-14 ASSESSMENT — VISUAL ACUITY
CORRECTION_TYPE: GLASSES
METHOD: SNELLEN - LINEAR
OS_CC: 20/20

## 2022-04-14 NOTE — PROGRESS NOTES
Laura Celis presents today for   Chief Complaint   Patient presents with    2 Week Follow-Up   . HPI     2 day follow up  Viroptic: 4-6 times daily  Polytrim: 3 x daily  States right eye is feeling a little better, but first thing in the morning before she is able to get drops in her eye hurts         Current Outpatient Medications   Medication Sig Dispense Refill    trimethoprim-polymyxin b (POLYTRIM) 05391-5.1 UNIT/ML-% ophthalmic solution Place 1 drop into the right eye 3 times daily for 10 days 1 each 0    trifluridine (VIROPTIC) 1 % ophthalmic solution 1 drop every 2 hours for 3 days ; then 1 drop 4 times daily for 7 days 1 each 3    PARoxetine (PAXIL) 20 MG tablet       Progesterone 200 MG SUPP Place vaginally      insulin aspart (NOVOLOG FLEXPEN) 100 UNIT/ML injection pen Inject 14 Units into the skin Daily with supper Indications: 4 units before breakfast 14 units before dinner       insulin NPH (HUMULIN N;NOVOLIN N) 100 UNIT/ML injection vial Inject 24 Units into the skin nightly       aspirin 81 MG tablet Take 81 mg by mouth daily      Prenatal Vit-Fe Fumarate-FA (PRENATAL PLUS) 27-1 MG TABS Take 1 tablet by mouth daily 90 tablet 3     No current facility-administered medications for this visit.          Family History   Problem Relation Age of Onset    Diabetes Mother         (borderline)    High Blood Pressure Mother     Diabetes Father     Heart Disease Father     Cataracts Father     Diabetes Brother     Breast Cancer Maternal Grandmother     Diabetes Maternal Grandmother     Diabetes Maternal Grandfather     Diabetes Paternal Grandmother     COPD Paternal Grandfather     Ovarian Cancer Other 28        Paternal cousin    Glaucoma Neg Hx      Social History     Socioeconomic History    Marital status: Single     Spouse name: None    Number of children: None    Years of education: None    Highest education level: None   Occupational History    None   Tobacco Use    Smoking status: Never Smoker    Smokeless tobacco: Never Used   Vaping Use    Vaping Use: Never used   Substance and Sexual Activity    Alcohol use: No    Drug use: No    Sexual activity: Yes     Partners: Male   Other Topics Concern    None   Social History Narrative    None     Social Determinants of Health     Financial Resource Strain:     Difficulty of Paying Living Expenses: Not on file   Food Insecurity:     Worried About Running Out of Food in the Last Year: Not on file    Jeannine of Food in the Last Year: Not on file   Transportation Needs:     Lack of Transportation (Medical): Not on file    Lack of Transportation (Non-Medical): Not on file   Physical Activity:     Days of Exercise per Week: Not on file    Minutes of Exercise per Session: Not on file   Stress:     Feeling of Stress : Not on file   Social Connections:     Frequency of Communication with Friends and Family: Not on file    Frequency of Social Gatherings with Friends and Family: Not on file    Attends [employer]ist Services: Not on file    Active Member of [address] [place] [address] or Organizations: Not on file    Attends Club or Organization Meetings: Not on file    Marital Status: Not on file   Intimate Partner Violence:     Fear of Current or Ex-Partner: Not on file    Emotionally Abused: Not on file    Physically Abused: Not on file    Sexually Abused: Not on file   Housing Stability:     Unable to Pay for Housing in the Last Year: Not on file    Number of [place] in the Last Year: Not on file    Unstable Housing in the Last Year: Not on file     Past Medical History:   Diagnosis Date    Anxiety     Bell's palsy     Constipation     Furuncles     (external genitalia).  GERD (gastroesophageal reflux disease)     History of chronic cholecystitis     (with cholelithiasis).     History of kidney stones     Hyperlipidemia        ROS     Negative for: Constitutional, Gastrointestinal, Neurological, Skin, Genitourinary, Musculoskeletal, HENT, Endocrine, Cardiovascular, Eyes, Respiratory, Psychiatric, Allergic/Imm, Heme/Lymph          Main Ophthalmology Exam     Slit Lamp Exam       Right Left    Lids/Lashes Normal     Conjunctiva/Sclera White and quiet     Cornea slight staining      Anterior Chamber Deep and quiet              <div id=\"MAIN_EXAM_REVIEWED\"></div>      Tonometry     Tonometry (Non-contact air puff, 4:00 PM)       Right Left    Pressure 22 21   IOP.7             19.8  CH:  8.9          9.5  WS: 7.4          7.6                 Not recorded       Not recorded         Visual Acuity (Snellen - Linear)       Right Left    Dist cc 20/20 20/20    Correction: Glasses          Not recorded       Not recorded       Not recorded           Ophthalmology Exam     Wearing Rx       Sphere Cylinder Axis Add    Right +0.25 ds  +1.00    Left +0.50 -0.25 130 +1.00    Age: 6m    Type: PAL              Not recorded              No orders of the defined types were placed in this encounter. IMPRESSION:  1. Herpes simplex keratitis of right eye        PLAN:    1. Use polytrim 3x per day;  Viroptic 3x per day and use the biotrue often       There are no Patient Instructions on file for this visit. Return in about 1 week (around 2022) for right eye simplex .

## 2022-04-21 ENCOUNTER — OFFICE VISIT (OUTPATIENT)
Dept: OPTOMETRY | Age: 42
End: 2022-04-21
Payer: COMMERCIAL

## 2022-04-21 DIAGNOSIS — B00.52 HERPES SIMPLEX KERATITIS OF RIGHT EYE: Primary | ICD-10-CM

## 2022-04-21 PROCEDURE — 99213 OFFICE O/P EST LOW 20 MIN: CPT | Performed by: OPTOMETRIST

## 2022-04-21 ASSESSMENT — TONOMETRY
OS_IOP_MMHG: 19
OD_IOP_MMHG: 19
IOP_METHOD: NON-CONTACT AIR PUFF

## 2022-04-21 ASSESSMENT — VISUAL ACUITY
METHOD: SNELLEN - LINEAR
CORRECTION_TYPE: GLASSES
OS_CC: 20/20

## 2022-04-21 NOTE — PROGRESS NOTES
Leny Betheasheila presents today for   Chief Complaint   Patient presents with    Follow-up   . HPI     1 week follow up right eye, hs keratitis   Doing much better,   Using both drops 3 times a day (viroptic and polytrim)  Artifical tears 3 times a day         Current Outpatient Medications   Medication Sig Dispense Refill    trimethoprim-polymyxin b (POLYTRIM) 03658-2.1 UNIT/ML-% ophthalmic solution Place 1 drop into the right eye 3 times daily for 10 days 1 each 0    trifluridine (VIROPTIC) 1 % ophthalmic solution 1 drop every 2 hours for 3 days ; then 1 drop 4 times daily for 7 days 1 each 3    PARoxetine (PAXIL) 20 MG tablet       Progesterone 200 MG SUPP Place vaginally      insulin aspart (NOVOLOG FLEXPEN) 100 UNIT/ML injection pen Inject 14 Units into the skin Daily with supper Indications: 4 units before breakfast 14 units before dinner       insulin NPH (HUMULIN N;NOVOLIN N) 100 UNIT/ML injection vial Inject 24 Units into the skin nightly       aspirin 81 MG tablet Take 81 mg by mouth daily      Prenatal Vit-Fe Fumarate-FA (PRENATAL PLUS) 27-1 MG TABS Take 1 tablet by mouth daily 90 tablet 3     No current facility-administered medications for this visit.          Family History   Problem Relation Age of Onset    Diabetes Mother         (borderline)    High Blood Pressure Mother     Diabetes Father     Heart Disease Father     Cataracts Father     Diabetes Brother     Breast Cancer Maternal Grandmother     Diabetes Maternal Grandmother     Diabetes Maternal Grandfather     Diabetes Paternal Grandmother     COPD Paternal Grandfather     Ovarian Cancer Other 28        Paternal cousin    Glaucoma Neg Hx      Social History     Socioeconomic History    Marital status: Single     Spouse name: None    Number of children: None    Years of education: None    Highest education level: None   Occupational History    None   Tobacco Use    Smoking status: Never Smoker    Smokeless tobacco: Never Used   Vaping Use    Vaping Use: Never used   Substance and Sexual Activity    Alcohol use: No    Drug use: No    Sexual activity: Yes     Partners: Male   Other Topics Concern    None   Social History Narrative    None     Social Determinants of Health     Financial Resource Strain:     Difficulty of Paying Living Expenses: Not on file   Food Insecurity:     Worried About Running Out of Food in the Last Year: Not on file    Jeannine of Food in the Last Year: Not on file   Transportation Needs:     Lack of Transportation (Medical): Not on file    Lack of Transportation (Non-Medical): Not on file   Physical Activity:     Days of Exercise per Week: Not on file    Minutes of Exercise per Session: Not on file   Stress:     Feeling of Stress : Not on file   Social Connections:     Frequency of Communication with Friends and Family: Not on file    Frequency of Social Gatherings with Friends and Family: Not on file    Attends Faith Services: Not on file    Active Member of [address] [place] [address] or Organizations: Not on file    Attends Club or Organization Meetings: Not on file    Marital Status: Not on file   Intimate Partner Violence:     Fear of Current or Ex-Partner: Not on file    Emotionally Abused: Not on file    Physically Abused: Not on file    Sexually Abused: Not on file   Housing Stability:     Unable to Pay for Housing in the Last Year: Not on file    Number of [place] in the Last Year: Not on file    Unstable Housing in the Last Year: Not on file     Past Medical History:   Diagnosis Date    Anxiety     Bell's palsy     Constipation     Furuncles     (external genitalia).  GERD (gastroesophageal reflux disease)     History of chronic cholecystitis     (with cholelithiasis).     History of kidney stones     Hyperlipidemia            Not recorded        Tonometry     Tonometry (Non-contact air puff, 1:03 PM)       Right Left    Pressure 19 19      Right / Left  IOPg 18.0 / 18.9  CH 9.5 / 9.9  WS 8.6 / 8.7                   Not recorded       Not recorded         Visual Acuity (Snellen - Linear)       Right Left    Dist cc 20/20 20/20    Correction: Glasses          Not recorded       Not recorded       Not recorded           Not recorded       Not recorded              No orders of the defined types were placed in this encounter. IMPRESSION:  1. Herpes simplex keratitis of right eye        PLAN:    1. Continue 3x per day with polytrim and 3x per day with vioptic and artificial tears in between       There are no Patient Instructions on file for this visit. Return in about 1 week (around 4/28/2022) for right eye .

## 2022-05-05 ENCOUNTER — OFFICE VISIT (OUTPATIENT)
Dept: OPTOMETRY | Age: 42
End: 2022-05-05
Payer: COMMERCIAL

## 2022-05-05 DIAGNOSIS — B00.52 HERPES SIMPLEX KERATITIS OF RIGHT EYE: Primary | ICD-10-CM

## 2022-05-05 PROCEDURE — 99213 OFFICE O/P EST LOW 20 MIN: CPT | Performed by: OPTOMETRIST

## 2022-05-05 ASSESSMENT — VISUAL ACUITY
METHOD: SNELLEN - LINEAR
OS_SC: 20/20
OD_SC: 20/20

## 2022-05-05 ASSESSMENT — TONOMETRY
IOP_METHOD: NON-CONTACT AIR PUFF
OS_IOP_MMHG: 17
OD_IOP_MMHG: 19

## 2022-05-05 ASSESSMENT — SLIT LAMP EXAM - LIDS: COMMENTS: NORMAL

## 2022-05-05 ASSESSMENT — ENCOUNTER SYMPTOMS
RESPIRATORY NEGATIVE: 0
EYES NEGATIVE: 0
ALLERGIC/IMMUNOLOGIC NEGATIVE: 0
GASTROINTESTINAL NEGATIVE: 0

## 2022-05-05 NOTE — PROGRESS NOTES
Jen Nicholson presents today for   Chief Complaint   Patient presents with    2 Week Follow-Up   . HPI     2 week follow up Recheck HSV OD   Viroptic 3 x daily  Polytrim 3 x daily  Artifical tears as needed  Patient states has been using atleast the antibiotic eye drop 1-2 x daily (carries it with her). Does not always get the viroptic in as it has to be refrigerated and she tends to forget it. Eye is feeling so much better! Current Outpatient Medications   Medication Sig Dispense Refill    trifluridine (VIROPTIC) 1 % ophthalmic solution 1 drop every 2 hours for 3 days ; then 1 drop 4 times daily for 7 days 1 each 3    PARoxetine (PAXIL) 20 MG tablet       Progesterone 200 MG SUPP Place vaginally      insulin aspart (NOVOLOG FLEXPEN) 100 UNIT/ML injection pen Inject 14 Units into the skin Daily with supper Indications: 4 units before breakfast 14 units before dinner       insulin NPH (HUMULIN N;NOVOLIN N) 100 UNIT/ML injection vial Inject 24 Units into the skin nightly       aspirin 81 MG tablet Take 81 mg by mouth daily      Prenatal Vit-Fe Fumarate-FA (PRENATAL PLUS) 27-1 MG TABS Take 1 tablet by mouth daily 90 tablet 3     No current facility-administered medications for this visit.          Family History   Problem Relation Age of Onset    Diabetes Mother         (borderline)    High Blood Pressure Mother     Diabetes Father     Heart Disease Father     Cataracts Father     Diabetes Brother     Breast Cancer Maternal Grandmother     Diabetes Maternal Grandmother     Diabetes Maternal Grandfather     Diabetes Paternal Grandmother     COPD Paternal Grandfather     Ovarian Cancer Other 28        Paternal cousin    Glaucoma Neg Hx      Social History     Socioeconomic History    Marital status: Single     Spouse name: None    Number of children: None    Years of education: None    Highest education level: None   Occupational History    None   Tobacco Use    Smoking status: Never Smoker    Smokeless tobacco: Never Used   Vaping Use    Vaping Use: Never used   Substance and Sexual Activity    Alcohol use: No    Drug use: No    Sexual activity: Yes     Partners: Male   Other Topics Concern    None   Social History Narrative    None     Social Determinants of Health     Financial Resource Strain:     Difficulty of Paying Living Expenses: Not on file   Food Insecurity:     Worried About Running Out of Food in the Last Year: Not on file    Jeannine of Food in the Last Year: Not on file   Transportation Needs:     Lack of Transportation (Medical): Not on file    Lack of Transportation (Non-Medical): Not on file   Physical Activity:     Days of Exercise per Week: Not on file    Minutes of Exercise per Session: Not on file   Stress:     Feeling of Stress : Not on file   Social Connections:     Frequency of Communication with Friends and Family: Not on file    Frequency of Social Gatherings with Friends and Family: Not on file    Attends Spiritism Services: Not on file    Active Member of [address] [place] [address] or Organizations: Not on file    Attends Club or Organization Meetings: Not on file    Marital Status: Not on file   Intimate Partner Violence:     Fear of Current or Ex-Partner: Not on file    Emotionally Abused: Not on file    Physically Abused: Not on file    Sexually Abused: Not on file   Housing Stability:     Unable to Pay for Housing in the Last Year: Not on file    Number of [place] in the Last Year: Not on file    Unstable Housing in the Last Year: Not on file     Past Medical History:   Diagnosis Date    Anxiety     Bell's palsy     Constipation     Furuncles     (external genitalia).  GERD (gastroesophageal reflux disease)     History of chronic cholecystitis     (with cholelithiasis).     History of kidney stones     Hyperlipidemia        ROS     Negative for: Constitutional, Gastrointestinal, Neurological, Skin, Genitourinary, Musculoskeletal, HENT, Endocrine, Cardiovascular, Eyes, Respiratory, Psychiatric, Allergic/Imm, Heme/Lymph          Main Ophthalmology Exam     Slit Lamp Exam       Right Left    Lids/Lashes Normal     Conjunctiva/Sclera White and quiet     Cornea slight staining      Anterior Chamber Deep and quiet              <div id=\"MAIN_EXAM_REVIEWED\"></div>      Tonometry     Tonometry (Non-contact air puff, 9:12 AM)       Right Left    Pressure 19 17   IOPg:                 CH:              WS:                            Not recorded       Not recorded         Visual Acuity (Snellen - Linear)       Right Left    Dist sc 20/20 20/20          Not recorded       Not recorded       Not recorded           Not recorded       Not recorded              No orders of the defined types were placed in this encounter. IMPRESSION:  1. Herpes simplex keratitis of right eye        PLAN:    1. Use polytrim 2x per day and biotrue atleast 4x day;  Discontinue viroptic. There are no Patient Instructions on file for this visit. Return in about 2 weeks (around 5/19/2022) for simplex right eye .

## 2022-05-24 ENCOUNTER — OFFICE VISIT (OUTPATIENT)
Dept: OPTOMETRY | Age: 42
End: 2022-05-24
Payer: COMMERCIAL

## 2022-05-24 DIAGNOSIS — B00.52 HERPES SIMPLEX KERATITIS OF RIGHT EYE: Primary | ICD-10-CM

## 2022-05-24 PROCEDURE — 99213 OFFICE O/P EST LOW 20 MIN: CPT | Performed by: OPTOMETRIST

## 2022-05-24 ASSESSMENT — TONOMETRY
OS_IOP_MMHG: 19
IOP_METHOD: NON-CONTACT AIR PUFF
OD_IOP_MMHG: 16

## 2022-05-24 ASSESSMENT — VISUAL ACUITY
OD_SC: 20/20
OD_PH_SC: 20/20 OU
METHOD: SNELLEN - LINEAR
OD_SC: 20/30 OU
OS_SC: 20/20

## 2022-05-24 ASSESSMENT — ENCOUNTER SYMPTOMS
ALLERGIC/IMMUNOLOGIC NEGATIVE: 0
EYES NEGATIVE: 0
RESPIRATORY NEGATIVE: 0
GASTROINTESTINAL NEGATIVE: 0

## 2022-05-24 ASSESSMENT — SLIT LAMP EXAM - LIDS: COMMENTS: NORMAL

## 2022-05-24 NOTE — PROGRESS NOTES
Marie Garces presents today for   Chief Complaint   Patient presents with    2 Week Follow-Up   . HPI     Patient is here for HSV Recheck. Polytrim 2 times daily. Biotrue 4 times daily. Has used the polytrim today once and the biotrue none  Used sometimes over the weekend but has been feeling good  [place] Necessary is still somewhat bothersome          Current Outpatient Medications   Medication Sig Dispense Refill    trifluridine (VIROPTIC) 1 % ophthalmic solution 1 drop every 2 hours for 3 days ; then 1 drop 4 times daily for 7 days 1 each 3    PARoxetine (PAXIL) 20 MG tablet       Progesterone 200 MG SUPP Place vaginally      insulin aspart (NOVOLOG FLEXPEN) 100 UNIT/ML injection pen Inject 14 Units into the skin Daily with supper Indications: 4 units before breakfast 14 units before dinner       insulin NPH (HUMULIN N;NOVOLIN N) 100 UNIT/ML injection vial Inject 24 Units into the skin nightly       aspirin 81 MG tablet Take 81 mg by mouth daily      Prenatal Vit-Fe Fumarate-FA (PRENATAL PLUS) 27-1 MG TABS Take 1 tablet by mouth daily 90 tablet 3     No current facility-administered medications for this visit.          Family History   Problem Relation Age of Onset    Diabetes Mother         (borderline)    High Blood Pressure Mother     Diabetes Father     Heart Disease Father     Cataracts Father     Diabetes Brother     Breast Cancer Maternal Grandmother     Diabetes Maternal Grandmother     Diabetes Maternal Grandfather     Diabetes Paternal Grandmother     COPD Paternal Grandfather     Ovarian Cancer Other 28        Paternal cousin    Glaucoma Neg Hx      Social History     Socioeconomic History    Marital status: Single     Spouse name: None    Number of children: None    Years of education: None    Highest education level: None   Occupational History    None   Tobacco Use    Smoking status: Never Smoker    Smokeless tobacco: Never Used   Vaping Use    Vaping Use: Never used Substance and Sexual Activity    Alcohol use: No    Drug use: No    Sexual activity: Yes     Partners: Male   Other Topics Concern    None   Social History Narrative    None     Social Determinants of Health     Financial Resource Strain:     Difficulty of Paying Living Expenses: Not on file   Food Insecurity:     Worried About Running Out of Food in the Last Year: Not on file    Jeannine of Food in the Last Year: Not on file   Transportation Needs:     Lack of Transportation (Medical): Not on file    Lack of Transportation (Non-Medical): Not on file   Physical Activity:     Days of Exercise per Week: Not on file    Minutes of Exercise per Session: Not on file   Stress:     Feeling of Stress : Not on file   Social Connections:     Frequency of Communication with Friends and Family: Not on file    Frequency of Social Gatherings with Friends and Family: Not on file    Attends Mor[employer] Services: Not on file    Active Member of [address] [place] [address] or Organizations: Not on file    Attends Club or Organization Meetings: Not on file    Marital Status: Not on file   Intimate Partner Violence:     Fear of Current or Ex-Partner: Not on file    Emotionally Abused: Not on file    Physically Abused: Not on file    Sexually Abused: Not on file   Housing Stability:     Unable to Pay for Housing in the Last Year: Not on file    Number of [place] in the Last Year: Not on file    Unstable Housing in the Last Year: Not on file     Past Medical History:   Diagnosis Date    Anxiety     Bell's palsy     Constipation     Furuncles     (external genitalia).  GERD (gastroesophageal reflux disease)     History of chronic cholecystitis     (with cholelithiasis).     History of kidney stones     Hyperlipidemia        ROS     Negative for: Constitutional, Gastrointestinal, Neurological, Skin, Genitourinary, Musculoskeletal, HENT, Endocrine, Cardiovascular, Eyes, Respiratory, Psychiatric, Allergic/Imm, Heme/Lymph Main Ophthalmology Exam     Slit Lamp Exam       Right Left    Lids/Lashes Normal     Conjunctiva/Sclera White and quiet     Cornea no vesicles;  just incomplete and irregular epithelial heaing      Anterior Chamber Deep and quiet              <div id=\"MAIN_EXAM_REVIEWED\"></div>      Tonometry     Tonometry (Non-contact air puff, 4:33 PM)       Right Left    Pressure 16 19   IOP.6               17.9  CH:  10.2          9.9  WS: 8.6          6.1                 Not recorded       Not recorded         Visual Acuity (Snellen - Linear)       Right Left    Dist sc 20/20 20/20    Dist ph sc 20/20 OU     Near sc 20/30 OU           Not recorded       Not recorded       Not recorded           Not recorded       Not recorded              No orders of the defined types were placed in this encounter. IMPRESSION:  1. Herpes simplex keratitis of right eye        PLAN:    1. 1 drop of polytrim daily  Continue biotrue 4x per day for a month       There are no Patient Instructions on file for this visit. Return in about 1 month (around 2022) for simplex keratitis .

## 2022-06-28 ENCOUNTER — OFFICE VISIT (OUTPATIENT)
Dept: OPTOMETRY | Age: 42
End: 2022-06-28
Payer: COMMERCIAL

## 2022-06-28 DIAGNOSIS — B00.52 HERPES SIMPLEX KERATITIS OF RIGHT EYE: Primary | ICD-10-CM

## 2022-06-28 PROCEDURE — 99213 OFFICE O/P EST LOW 20 MIN: CPT | Performed by: OPTOMETRIST

## 2022-06-28 ASSESSMENT — ENCOUNTER SYMPTOMS
GASTROINTESTINAL NEGATIVE: 0
RESPIRATORY NEGATIVE: 0
ALLERGIC/IMMUNOLOGIC NEGATIVE: 0
EYES NEGATIVE: 0

## 2022-06-28 ASSESSMENT — VISUAL ACUITY
OS_SC: 20/20
METHOD: SNELLEN - LINEAR
OD_SC: 20/20

## 2022-06-28 ASSESSMENT — TONOMETRY
OS_IOP_MMHG: 18
IOP_METHOD: NON-CONTACT AIR PUFF
OD_IOP_MMHG: 19

## 2022-06-28 ASSESSMENT — SLIT LAMP EXAM - LIDS: COMMENTS: NORMAL

## 2022-06-28 NOTE — PROGRESS NOTES
Thom Vasques presents today for   Chief Complaint   Patient presents with    1 Month Follow-Up   . HPI     1 month HSV Recheck  Poly trim daily  Biotrue 4 x daily  Doing very well          Current Outpatient Medications   Medication Sig Dispense Refill    PARoxetine (PAXIL) 20 MG tablet       Progesterone 200 MG SUPP Place vaginally      insulin aspart (NOVOLOG FLEXPEN) 100 UNIT/ML injection pen Inject 14 Units into the skin Daily with supper Indications: 4 units before breakfast 14 units before dinner       insulin NPH (HUMULIN N;NOVOLIN N) 100 UNIT/ML injection vial Inject 24 Units into the skin nightly       aspirin 81 MG tablet Take 81 mg by mouth daily      Prenatal Vit-Fe Fumarate-FA (PRENATAL PLUS) 27-1 MG TABS Take 1 tablet by mouth daily 90 tablet 3     No current facility-administered medications for this visit.          Family History   Problem Relation Age of Onset    Diabetes Mother         (borderline)    High Blood Pressure Mother     Diabetes Father     Heart Disease Father     Cataracts Father     Diabetes Brother     Breast Cancer Maternal Grandmother     Diabetes Maternal Grandmother     Diabetes Maternal Grandfather     Diabetes Paternal Grandmother     COPD Paternal Grandfather     Ovarian Cancer Other 28        Paternal cousin    Glaucoma Neg Hx      Social History     Socioeconomic History    Marital status: Single     Spouse name: None    Number of children: None    Years of education: None    Highest education level: None   Occupational History    None   Tobacco Use    Smoking status: Never Smoker    Smokeless tobacco: Never Used   Vaping Use    Vaping Use: Never used   Substance and Sexual Activity    Alcohol use: No    Drug use: No    Sexual activity: Yes     Partners: Male   Other Topics Concern    None   Social History Narrative    None     Social Determinants of Health     Financial Resource Strain:     Difficulty of Paying Living Expenses: Not on file   Food Insecurity:     Worried About Running Out of Food in the Last Year: Not on file    Jeannine of Food in the Last Year: Not on file   Transportation Needs:     Lack of Transportation (Medical): Not on file    Lack of Transportation (Non-Medical): Not on file   Physical Activity:     Days of Exercise per Week: Not on file    Minutes of Exercise per Session: Not on file   Stress:     Feeling of Stress : Not on file   Social Connections:     Frequency of Communication with Friends and Family: Not on file    Frequency of Social Gatherings with Friends and Family: Not on file    Attends Yazidi Services: Not on file    Active Member of [address] [place] [address] [employer] or Organizations: Not on file    Attends Club or Organization Meetings: Not on file    Marital Status: Not on file   Intimate Partner Violence:     Fear of Current or Ex-Partner: Not on file    Emotionally Abused: Not on file    Physically Abused: Not on file    Sexually Abused: Not on file   Housing Stability:     Unable to Pay for Housing in the Last Year: Not on file    Number of [place] in the Last Year: Not on file    Unstable Housing in the Last Year: Not on file     Past Medical History:   Diagnosis Date    Anxiety     Bell's palsy     Constipation     Furuncles     (external genitalia).  GERD (gastroesophageal reflux disease)     History of chronic cholecystitis     (with cholelithiasis).     History of kidney stones     Hyperlipidemia        ROS     Negative for: Constitutional, Gastrointestinal, Neurological, Skin, Genitourinary, Musculoskeletal, HENT, Endocrine, Cardiovascular, Eyes, Respiratory, Psychiatric, Allergic/Imm, Heme/Lymph          Main Ophthalmology Exam     Slit Lamp Exam       Right Left    Lids/Lashes Normal     Conjunctiva/Sclera White and quiet     Cornea irregular surface      Anterior Chamber Deep and quiet     Iris Round and reactive              <div id=\"MAIN_EXAM_REVIEWED\"></div>      Tonometry